# Patient Record
Sex: MALE | Race: WHITE | Employment: FULL TIME | URBAN - METROPOLITAN AREA
[De-identification: names, ages, dates, MRNs, and addresses within clinical notes are randomized per-mention and may not be internally consistent; named-entity substitution may affect disease eponyms.]

---

## 2018-06-19 ENCOUNTER — OFFICE VISIT (OUTPATIENT)
Dept: ENT CLINIC | Age: 63
End: 2018-06-19
Payer: COMMERCIAL

## 2018-06-19 VITALS
HEIGHT: 74 IN | BODY MASS INDEX: 30.51 KG/M2 | WEIGHT: 237.7 LBS | SYSTOLIC BLOOD PRESSURE: 139 MMHG | DIASTOLIC BLOOD PRESSURE: 92 MMHG | HEART RATE: 66 BPM

## 2018-06-19 DIAGNOSIS — B36.9 OTOMYCOSIS OF LEFT EAR: Primary | ICD-10-CM

## 2018-06-19 DIAGNOSIS — H61.23 BILATERAL IMPACTED CERUMEN: ICD-10-CM

## 2018-06-19 DIAGNOSIS — H62.42 OTOMYCOSIS OF LEFT EAR: Primary | ICD-10-CM

## 2018-06-19 PROCEDURE — 99214 OFFICE O/P EST MOD 30 MIN: CPT | Performed by: OTOLARYNGOLOGY

## 2018-06-19 ASSESSMENT — ENCOUNTER SYMPTOMS
GASTROINTESTINAL NEGATIVE: 1
EYES NEGATIVE: 1
RESPIRATORY NEGATIVE: 1
ALLERGIC/IMMUNOLOGIC NEGATIVE: 1

## 2018-12-18 ENCOUNTER — OFFICE VISIT (OUTPATIENT)
Dept: ENT CLINIC | Age: 63
End: 2018-12-18
Payer: COMMERCIAL

## 2018-12-18 VITALS
WEIGHT: 241 LBS | DIASTOLIC BLOOD PRESSURE: 82 MMHG | HEART RATE: 68 BPM | SYSTOLIC BLOOD PRESSURE: 150 MMHG | BODY MASS INDEX: 29.97 KG/M2 | HEIGHT: 75 IN

## 2018-12-18 DIAGNOSIS — H61.23 HEARING LOSS DUE TO CERUMEN IMPACTION, BILATERAL: Primary | ICD-10-CM

## 2018-12-18 PROCEDURE — 69210 REMOVE IMPACTED EAR WAX UNI: CPT | Performed by: OTOLARYNGOLOGY

## 2018-12-23 ASSESSMENT — ENCOUNTER SYMPTOMS
RHINORRHEA: 0
SINUS PRESSURE: 0
VOMITING: 0
TROUBLE SWALLOWING: 0
SINUS PAIN: 0
VOICE CHANGE: 0
SHORTNESS OF BREATH: 0
COUGH: 0

## 2018-12-23 NOTE — PROGRESS NOTES
[address] [facility] Otolaryngology  Dr. Matthieu Woo. Lacey Gonzales. Ms.Ed        Patient Name:  Rafat Serrano  :  1955     CHIEF C/O:    Chief Complaint   Patient presents with    Ear Problem     6 month f/u ear fullness   pt states that he is not having any problems at this time       HISTORY OBTAINED FROM:  patient    HISTORY OF PRESENT ILLNESS:       Lonnie Holley is a 61y.o. year old male, here today for follow up of Her loss. Patient states began approximately for 6 weeks ago as progressive nature. Patient has no history of hearing loss, no fullness, and ear pain. No current complaints of vertigo. No current complaints of ear drainage. Patient does not use Q-tips, no other drops this time. No prior history of headaches or vision changes. History reviewed. No pertinent past medical history. Past Surgical History:   Procedure Laterality Date    EYE SURGERY      lens implant rt eye     No current outpatient prescriptions on file. Patient has no known allergies. Social History   Substance Use Topics    Smoking status: Former Smoker     Packs/day: 1.00     Years: 20.00     Types: Cigarettes     Quit date: 1944    Smokeless tobacco: Never Used    Alcohol use No     History reviewed. No pertinent family history. Review of Systems   Constitutional: Negative for chills and fever. HENT: Positive for hearing loss. Negative for ear discharge, postnasal drip, rhinorrhea, sinus pain, sinus pressure, trouble swallowing and voice change. Respiratory: Negative for cough and shortness of breath. Cardiovascular: Negative for chest pain and palpitations. Gastrointestinal: Negative for vomiting. Skin: Negative for rash. Allergic/Immunologic: Negative for environmental allergies. Neurological: Negative for dizziness and headaches. Hematological: Does not bruise/bleed easily. All other systems reviewed and are negative.       BP (!) 150/82 (Site: Left Upper Arm, Position: Sitting, Cuff Size: Large Adult) Residency  Associate Clinical Professor:  Andrews Castillo, [facility]

## 2019-06-18 ENCOUNTER — OFFICE VISIT (OUTPATIENT)
Dept: ENT CLINIC | Age: 64
End: 2019-06-18
Payer: COMMERCIAL

## 2019-06-18 VITALS
DIASTOLIC BLOOD PRESSURE: 100 MMHG | HEART RATE: 99 BPM | BODY MASS INDEX: 31.44 KG/M2 | SYSTOLIC BLOOD PRESSURE: 139 MMHG | HEIGHT: 74 IN | WEIGHT: 245 LBS

## 2019-06-18 DIAGNOSIS — T16.1XXA ACUTE FOREIGN BODY OF RIGHT EAR CANAL, INITIAL ENCOUNTER: ICD-10-CM

## 2019-06-18 DIAGNOSIS — H61.23 BILATERAL IMPACTED CERUMEN: Primary | ICD-10-CM

## 2019-06-18 PROCEDURE — 69210 REMOVE IMPACTED EAR WAX UNI: CPT | Performed by: OTOLARYNGOLOGY

## 2019-06-18 PROCEDURE — 99213 OFFICE O/P EST LOW 20 MIN: CPT | Performed by: OTOLARYNGOLOGY

## 2019-06-18 ASSESSMENT — ENCOUNTER SYMPTOMS
VOMITING: 0
EYE DISCHARGE: 0
STRIDOR: 0
COUGH: 0
SHORTNESS OF BREATH: 0
NAUSEA: 0
COLOR CHANGE: 0
ALLERGIC/IMMUNOLOGIC NEGATIVE: 1
EYE REDNESS: 0

## 2019-06-18 NOTE — PROGRESS NOTES
Subjective:     Patient ID:  Matt Bowles is a 61 y.o. male. HPI:    Pt presents with a history of cerumen impactionremoval.   The patients ear was last cleaned 6 month(s)ago. The patient was using ear drops to loosen wax immediately priorto this visit. Hearing aids: no      Patient's medications, allergies, past medical,surgical, social and family histories were reviewed and updated as appropriate. Review of Systems   Constitutional: Negative for chills, fatigue and fever. HENT: Negative for ear discharge, ear pain and hearing loss. Eyes: Negative for discharge and redness. Respiratory: Negative for cough, shortness of breath and stridor. Gastrointestinal: Negative for nausea and vomiting. Endocrine: Negative. Genitourinary: Negative. Musculoskeletal: Negative. Skin: Negative for color change and rash. Allergic/Immunologic: Negative. Neurological: Negative for dizziness, speech difficulty and headaches. Hematological: Negative. Psychiatric/Behavioral: Negative for agitation and confusion. All other systems reviewed and are negative. Objective:   Physical Exam   Constitutional: He is oriented to person, place, and time. He appears well-developed and well-nourished. HENT:   Head: Normocephalic and atraumatic. Right Ear: Tympanic membrane, external ear and ear canal normal.   Left Ear: Tympanic membrane, external ear and ear canal normal.   Ears:    Nose: Nose normal.   Mouth/Throat: Oropharynx is clear and moist.   Eyes: Pupils are equal, round, and reactive to light. Neck: Normal range of motion. Cardiovascular: Normal rate. Pulmonary/Chest: Effort normal.   Musculoskeletal: Normal range of motion. Neurological: He is alert and oriented to person, place, and time. Skin: Skin is warm and dry. Cerumen removal     Auditory canal(s) both earscompletely obstructed with cerumen.   A microscope was used due to deep impaction of the

## 2019-12-17 ENCOUNTER — OFFICE VISIT (OUTPATIENT)
Dept: ENT CLINIC | Age: 64
End: 2019-12-17
Payer: COMMERCIAL

## 2019-12-17 VITALS
SYSTOLIC BLOOD PRESSURE: 124 MMHG | HEART RATE: 60 BPM | WEIGHT: 244 LBS | HEIGHT: 75 IN | DIASTOLIC BLOOD PRESSURE: 70 MMHG | RESPIRATION RATE: 20 BRPM | BODY MASS INDEX: 30.34 KG/M2

## 2019-12-17 DIAGNOSIS — H61.23 BILATERAL IMPACTED CERUMEN: Primary | ICD-10-CM

## 2019-12-17 PROCEDURE — 69210 REMOVE IMPACTED EAR WAX UNI: CPT | Performed by: OTOLARYNGOLOGY

## 2019-12-17 ASSESSMENT — ENCOUNTER SYMPTOMS
ALLERGIC/IMMUNOLOGIC NEGATIVE: 1
RESPIRATORY NEGATIVE: 1
EYES NEGATIVE: 1
TROUBLE SWALLOWING: 0
GASTROINTESTINAL NEGATIVE: 1
SORE THROAT: 0
VOICE CHANGE: 0

## 2020-06-16 ENCOUNTER — OFFICE VISIT (OUTPATIENT)
Dept: ENT CLINIC | Age: 65
End: 2020-06-16
Payer: COMMERCIAL

## 2020-06-16 VITALS
HEIGHT: 74 IN | WEIGHT: 237 LBS | HEART RATE: 68 BPM | DIASTOLIC BLOOD PRESSURE: 91 MMHG | SYSTOLIC BLOOD PRESSURE: 143 MMHG | BODY MASS INDEX: 30.42 KG/M2

## 2020-06-16 PROCEDURE — 69210 REMOVE IMPACTED EAR WAX UNI: CPT | Performed by: NURSE PRACTITIONER

## 2021-01-12 ENCOUNTER — OFFICE VISIT (OUTPATIENT)
Dept: ENT CLINIC | Age: 66
End: 2021-01-12
Payer: COMMERCIAL

## 2021-01-12 VITALS — BODY MASS INDEX: 29.84 KG/M2 | HEIGHT: 75 IN | WEIGHT: 240 LBS | TEMPERATURE: 97.3 F

## 2021-01-12 DIAGNOSIS — H61.23 BILATERAL IMPACTED CERUMEN: Primary | ICD-10-CM

## 2021-01-12 PROCEDURE — 69210 REMOVE IMPACTED EAR WAX UNI: CPT | Performed by: NURSE PRACTITIONER

## 2021-01-12 NOTE — PROGRESS NOTES
HENT: Negative for ear discharge, ear pain and hearing loss. Objective:     Vitals:    01/12/21 0857   Temp: 97.3 °F (36.3 °C)     Physical Exam  HENT:      Right Ear: Tympanic membrane, ear canal and external ear normal. There is impacted cerumen. Left Ear: Tympanic membrane, ear canal and external ear normal. There is impacted cerumen. Ears:      Comments: Bilateral TMs normal after cerumen removed            Cerumen removal     Auditory canal(s) both ears completely obstructed with cerumen. A microscope was used . Cerumen was gently removed using soft plastic curette, alligator forceps, suction. Tympanic membranes are intact following the procedure. Auditory canals appear normal.            Assessment:       Diagnosis Orders   1.  Bilateral impacted cerumen  CT REMOVAL IMPACTED CERUMEN INSTRUMENTATION UNILAT              Plan:      Call for new or worsening symptoms  Follow up in 6 month(s)    Lisa Duarte, LANA, FNP-C  [facility], Nose and Throat    The information contained in this note has been dictated using drug and medical speech recognition software and may contain errors

## 2024-01-01 ENCOUNTER — OUTSIDE SERVICES (OUTPATIENT)
Dept: INTERNAL MEDICINE CLINIC | Age: 69
End: 2024-01-01
Payer: MEDICARE

## 2024-01-01 ENCOUNTER — PREP FOR PROCEDURE (OUTPATIENT)
Dept: HEMATOLOGY | Age: 69
End: 2024-01-01

## 2024-01-01 DIAGNOSIS — M62.50 MUSCLE WASTING AND ATROPHY, NOT ELSEWHERE CLASSIFIED, UNSPECIFIED SITE: ICD-10-CM

## 2024-01-01 DIAGNOSIS — K21.9 GASTROESOPHAGEAL REFLUX DISEASE WITHOUT ESOPHAGITIS: ICD-10-CM

## 2024-01-01 DIAGNOSIS — C22.1 INTRAHEPATIC BILE DUCT CARCINOMA (HCC): ICD-10-CM

## 2024-01-01 DIAGNOSIS — R18.8 OTHER ASCITES: ICD-10-CM

## 2024-01-01 DIAGNOSIS — R13.11 DYSPHAGIA, ORAL PHASE: ICD-10-CM

## 2024-01-01 DIAGNOSIS — Z74.1 NEED FOR ASSISTANCE WITH PERSONAL CARE: ICD-10-CM

## 2024-01-01 DIAGNOSIS — D50.9 IRON DEFICIENCY ANEMIA, UNSPECIFIED IRON DEFICIENCY ANEMIA TYPE: ICD-10-CM

## 2024-01-01 DIAGNOSIS — C76.2 MALIGNANT NEOPLASM OF ABDOMEN (HCC): Primary | ICD-10-CM

## 2024-01-01 DIAGNOSIS — K74.69 OTHER CIRRHOSIS OF LIVER (HCC): ICD-10-CM

## 2024-01-01 DIAGNOSIS — M62.81 MUSCLE WEAKNESS (GENERALIZED): ICD-10-CM

## 2024-01-01 DIAGNOSIS — C78.7 SECONDARY MALIGNANT NEOPLASM OF LIVER AND INTRAHEPATIC BILE DUCT (HCC): ICD-10-CM

## 2024-01-01 DIAGNOSIS — J90 PLEURAL EFFUSION: ICD-10-CM

## 2024-01-01 DIAGNOSIS — E43 UNSPECIFIED SEVERE PROTEIN-CALORIE MALNUTRITION (HCC): ICD-10-CM

## 2024-01-01 PROCEDURE — 99305 1ST NF CARE MODERATE MDM 35: CPT | Performed by: INTERNAL MEDICINE

## 2024-01-01 RX ORDER — SODIUM CHLORIDE 0.9 % (FLUSH) 0.9 %
5-40 SYRINGE (ML) INJECTION EVERY 12 HOURS SCHEDULED
Status: CANCELLED | OUTPATIENT
Start: 2024-01-01

## 2024-01-01 RX ORDER — SODIUM CHLORIDE 9 MG/ML
INJECTION, SOLUTION INTRAVENOUS PRN
Status: CANCELLED | OUTPATIENT
Start: 2024-01-01

## 2024-01-01 RX ORDER — SODIUM CHLORIDE 0.9 % (FLUSH) 0.9 %
5-40 SYRINGE (ML) INJECTION PRN
Status: CANCELLED | OUTPATIENT
Start: 2024-01-01

## 2024-04-19 ENCOUNTER — APPOINTMENT (OUTPATIENT)
Dept: GENERAL RADIOLOGY | Age: 69
DRG: 420 | End: 2024-04-19
Payer: MEDICARE

## 2024-04-19 ENCOUNTER — APPOINTMENT (OUTPATIENT)
Dept: CT IMAGING | Age: 69
DRG: 420 | End: 2024-04-19
Payer: MEDICARE

## 2024-04-19 ENCOUNTER — HOSPITAL ENCOUNTER (INPATIENT)
Age: 69
LOS: 6 days | Discharge: HOME | DRG: 420 | End: 2024-04-26
Attending: EMERGENCY MEDICINE | Admitting: HOSPITALIST
Payer: MEDICARE

## 2024-04-19 DIAGNOSIS — J90 PLEURAL EFFUSION: ICD-10-CM

## 2024-04-19 DIAGNOSIS — D64.9 ANEMIA, UNSPECIFIED: ICD-10-CM

## 2024-04-19 DIAGNOSIS — J18.9 PNEUMONIA OF BOTH LOWER LOBES DUE TO INFECTIOUS ORGANISM: ICD-10-CM

## 2024-04-19 DIAGNOSIS — R18.8 OTHER ASCITES: ICD-10-CM

## 2024-04-19 DIAGNOSIS — I24.89 DEMAND ISCHEMIA (HCC): ICD-10-CM

## 2024-04-19 DIAGNOSIS — K74.69 OTHER CIRRHOSIS OF LIVER (HCC): ICD-10-CM

## 2024-04-19 DIAGNOSIS — K92.2 GASTROINTESTINAL HEMORRHAGE, UNSPECIFIED GASTROINTESTINAL HEMORRHAGE TYPE: Primary | ICD-10-CM

## 2024-04-19 DIAGNOSIS — K76.9 LIVER LESION: ICD-10-CM

## 2024-04-19 DIAGNOSIS — J96.01 ACUTE RESPIRATORY FAILURE WITH HYPOXIA (HCC): ICD-10-CM

## 2024-04-19 DIAGNOSIS — J18.9 PNEUMONIA DUE TO INFECTIOUS ORGANISM, UNSPECIFIED LATERALITY, UNSPECIFIED PART OF LUNG: ICD-10-CM

## 2024-04-19 DIAGNOSIS — E87.3 RESPIRATORY ALKALOSIS: ICD-10-CM

## 2024-04-19 DIAGNOSIS — N17.9 AKI (ACUTE KIDNEY INJURY) (HCC): ICD-10-CM

## 2024-04-19 LAB
ALBUMIN SERPL-MCNC: 1.1 G/DL (ref 3.5–5.2)
ALP SERPL-CCNC: 45 U/L (ref 40–129)
ALT SERPL-CCNC: <5 U/L (ref 0–40)
ANION GAP SERPL CALCULATED.3IONS-SCNC: 13 MMOL/L (ref 7–16)
AST SERPL-CCNC: 10 U/L (ref 0–39)
B.E.: -3.2 MMOL/L (ref -3–3)
BASOPHILS # BLD: 0 K/UL (ref 0–0.2)
BASOPHILS NFR BLD: 0 % (ref 0–2)
BILIRUB SERPL-MCNC: 0.7 MG/DL (ref 0–1.2)
BUN SERPL-MCNC: 16 MG/DL (ref 6–23)
CALCIUM SERPL-MCNC: 3.7 MG/DL (ref 8.6–10.2)
CHLORIDE SERPL-SCNC: 115 MMOL/L (ref 98–107)
CO2 SERPL-SCNC: 11 MMOL/L (ref 22–29)
COHB: 1 % (ref 0–1.5)
CREAT SERPL-MCNC: 0.7 MG/DL (ref 0.7–1.2)
CRITICAL: ABNORMAL
DATE ANALYZED: ABNORMAL
DATE OF COLLECTION: ABNORMAL
EOSINOPHIL # BLD: 0 K/UL (ref 0.05–0.5)
EOSINOPHILS RELATIVE PERCENT: 0 % (ref 0–6)
ERYTHROCYTE [DISTWIDTH] IN BLOOD BY AUTOMATED COUNT: 19.8 % (ref 11.5–15)
GFR SERPL CREATININE-BSD FRML MDRD: >90 ML/MIN/1.73M2
GLUCOSE SERPL-MCNC: 62 MG/DL (ref 74–99)
HCO3: 18.3 MMOL/L (ref 22–26)
HCT VFR BLD AUTO: 21.2 % (ref 37–54)
HGB BLD-MCNC: 6.1 G/DL (ref 12.5–16.5)
HHB: 1.8 % (ref 0–5)
LAB: ABNORMAL
LACTATE BLDV-SCNC: 4.3 MMOL/L (ref 0.5–2.2)
LYMPHOCYTES NFR BLD: 0 K/UL (ref 1.5–4)
LYMPHOCYTES RELATIVE PERCENT: 0 % (ref 20–42)
Lab: 2304
MAGNESIUM SERPL-MCNC: 1.9 MG/DL (ref 1.6–2.6)
MCH RBC QN AUTO: 21.6 PG (ref 26–35)
MCHC RBC AUTO-ENTMCNC: 28.8 G/DL (ref 32–34.5)
MCV RBC AUTO: 74.9 FL (ref 80–99.9)
METAMYELOCYTES ABSOLUTE COUNT: 0.1 K/UL (ref 0–0.12)
METAMYELOCYTES: 1 % (ref 0–1)
METHB: 0.2 % (ref 0–1.5)
MODE: ABNORMAL
MONOCYTES NFR BLD: 0.1 K/UL (ref 0.1–0.95)
MONOCYTES NFR BLD: 1 % (ref 2–12)
NEUTROPHILS NFR BLD: 98 % (ref 43–80)
NEUTS SEG NFR BLD: 11.1 K/UL (ref 1.8–7.3)
O2 SATURATION: 98.2 % (ref 92–98.5)
O2HB: 97 % (ref 94–97)
OPERATOR ID: ABNORMAL
PATIENT TEMP: 37 C
PCO2: 22.8 MMHG (ref 35–45)
PH BLOOD GAS: 7.52 (ref 7.35–7.45)
PLATELET # BLD AUTO: 136 K/UL (ref 130–450)
PMV BLD AUTO: 9.9 FL (ref 7–12)
PO2: 106.1 MMHG (ref 75–100)
POTASSIUM SERPL-SCNC: 2.3 MMOL/L (ref 3.5–5)
PROT SERPL-MCNC: 3 G/DL (ref 6.4–8.3)
RBC # BLD AUTO: 2.83 M/UL (ref 3.8–5.8)
RBC # BLD: ABNORMAL 10*6/UL
SODIUM SERPL-SCNC: 139 MMOL/L (ref 132–146)
SOURCE, BLOOD GAS: ABNORMAL
THB: 10.5 G/DL (ref 11.5–16.5)
TIME ANALYZED: 2308
TROPONIN I SERPL HS-MCNC: 21 NG/L (ref 0–11)
TROPONIN I SERPL HS-MCNC: 36 NG/L (ref 0–11)
WBC OTHER # BLD: 11.3 K/UL (ref 4.5–11.5)

## 2024-04-19 PROCEDURE — C9113 INJ PANTOPRAZOLE SODIUM, VIA: HCPCS

## 2024-04-19 PROCEDURE — 83605 ASSAY OF LACTIC ACID: CPT

## 2024-04-19 PROCEDURE — 2580000003 HC RX 258

## 2024-04-19 PROCEDURE — 84484 ASSAY OF TROPONIN QUANT: CPT

## 2024-04-19 PROCEDURE — 81001 URINALYSIS AUTO W/SCOPE: CPT

## 2024-04-19 PROCEDURE — 86850 RBC ANTIBODY SCREEN: CPT

## 2024-04-19 PROCEDURE — 87040 BLOOD CULTURE FOR BACTERIA: CPT

## 2024-04-19 PROCEDURE — 99285 EMERGENCY DEPT VISIT HI MDM: CPT

## 2024-04-19 PROCEDURE — 85025 COMPLETE CBC W/AUTO DIFF WBC: CPT

## 2024-04-19 PROCEDURE — 2580000003 HC RX 258: Performed by: EMERGENCY MEDICINE

## 2024-04-19 PROCEDURE — 80048 BASIC METABOLIC PNL TOTAL CA: CPT

## 2024-04-19 PROCEDURE — 72125 CT NECK SPINE W/O DYE: CPT

## 2024-04-19 PROCEDURE — 96365 THER/PROPH/DIAG IV INF INIT: CPT

## 2024-04-19 PROCEDURE — 71275 CT ANGIOGRAPHY CHEST: CPT

## 2024-04-19 PROCEDURE — 74177 CT ABD & PELVIS W/CONTRAST: CPT

## 2024-04-19 PROCEDURE — 6360000002 HC RX W HCPCS

## 2024-04-19 PROCEDURE — 86923 COMPATIBILITY TEST ELECTRIC: CPT

## 2024-04-19 PROCEDURE — 96375 TX/PRO/DX INJ NEW DRUG ADDON: CPT

## 2024-04-19 PROCEDURE — 71045 X-RAY EXAM CHEST 1 VIEW: CPT

## 2024-04-19 PROCEDURE — 70450 CT HEAD/BRAIN W/O DYE: CPT

## 2024-04-19 PROCEDURE — A4216 STERILE WATER/SALINE, 10 ML: HCPCS

## 2024-04-19 PROCEDURE — 82805 BLOOD GASES W/O2 SATURATION: CPT

## 2024-04-19 PROCEDURE — 80053 COMPREHEN METABOLIC PANEL: CPT

## 2024-04-19 PROCEDURE — 93005 ELECTROCARDIOGRAM TRACING: CPT | Performed by: EMERGENCY MEDICINE

## 2024-04-19 PROCEDURE — 83735 ASSAY OF MAGNESIUM: CPT

## 2024-04-19 PROCEDURE — 86901 BLOOD TYPING SEROLOGIC RH(D): CPT

## 2024-04-19 PROCEDURE — 6360000004 HC RX CONTRAST MEDICATION: Performed by: RADIOLOGY

## 2024-04-19 PROCEDURE — 2580000003 HC RX 258: Performed by: RADIOLOGY

## 2024-04-19 PROCEDURE — 86900 BLOOD TYPING SEROLOGIC ABO: CPT

## 2024-04-19 PROCEDURE — 96361 HYDRATE IV INFUSION ADD-ON: CPT

## 2024-04-19 PROCEDURE — 36556 INSERT NON-TUNNEL CV CATH: CPT

## 2024-04-19 RX ORDER — SODIUM CHLORIDE 0.9 % (FLUSH) 0.9 %
10 SYRINGE (ML) INJECTION PRN
Status: COMPLETED | OUTPATIENT
Start: 2024-04-19 | End: 2024-04-19

## 2024-04-19 RX ORDER — POTASSIUM CHLORIDE 7.45 MG/ML
10 INJECTION INTRAVENOUS
Status: DISCONTINUED | OUTPATIENT
Start: 2024-04-20 | End: 2024-04-20

## 2024-04-19 RX ORDER — PROCHLORPERAZINE EDISYLATE 5 MG/ML
10 INJECTION INTRAMUSCULAR; INTRAVENOUS ONCE
Status: COMPLETED | OUTPATIENT
Start: 2024-04-19 | End: 2024-04-19

## 2024-04-19 RX ORDER — 0.9 % SODIUM CHLORIDE 0.9 %
1000 INTRAVENOUS SOLUTION INTRAVENOUS ONCE
Status: COMPLETED | OUTPATIENT
Start: 2024-04-19 | End: 2024-04-19

## 2024-04-19 RX ORDER — SODIUM CHLORIDE 9 MG/ML
INJECTION, SOLUTION INTRAVENOUS CONTINUOUS
Status: DISCONTINUED | OUTPATIENT
Start: 2024-04-19 | End: 2024-04-20

## 2024-04-19 RX ORDER — SODIUM CHLORIDE 9 MG/ML
INJECTION, SOLUTION INTRAVENOUS PRN
Status: DISCONTINUED | OUTPATIENT
Start: 2024-04-19 | End: 2024-04-22

## 2024-04-19 RX ADMIN — PROCHLORPERAZINE EDISYLATE 10 MG: 5 INJECTION INTRAMUSCULAR; INTRAVENOUS at 20:51

## 2024-04-19 RX ADMIN — IOPAMIDOL 80 ML: 755 INJECTION, SOLUTION INTRAVENOUS at 21:23

## 2024-04-19 RX ADMIN — Medication 10 ML: at 21:24

## 2024-04-19 RX ADMIN — MEROPENEM 1000 MG: 1 INJECTION, POWDER, FOR SOLUTION INTRAVENOUS at 23:31

## 2024-04-19 RX ADMIN — SODIUM CHLORIDE 1000 ML: 9 INJECTION, SOLUTION INTRAVENOUS at 22:30

## 2024-04-19 RX ADMIN — PIPERACILLIN AND TAZOBACTAM 4500 MG: 4; .5 INJECTION, POWDER, FOR SOLUTION INTRAVENOUS at 23:18

## 2024-04-19 RX ADMIN — SODIUM CHLORIDE 80 MG: 9 INJECTION INTRAMUSCULAR; INTRAVENOUS; SUBCUTANEOUS at 21:21

## 2024-04-19 RX ADMIN — SODIUM CHLORIDE: 9 INJECTION, SOLUTION INTRAVENOUS at 20:43

## 2024-04-19 ASSESSMENT — LIFESTYLE VARIABLES
HOW OFTEN DO YOU HAVE A DRINK CONTAINING ALCOHOL: NEVER
HOW MANY STANDARD DRINKS CONTAINING ALCOHOL DO YOU HAVE ON A TYPICAL DAY: PATIENT DOES NOT DRINK

## 2024-04-19 ASSESSMENT — PAIN - FUNCTIONAL ASSESSMENT: PAIN_FUNCTIONAL_ASSESSMENT: NONE - DENIES PAIN

## 2024-04-20 ENCOUNTER — APPOINTMENT (OUTPATIENT)
Dept: GENERAL RADIOLOGY | Age: 69
DRG: 420 | End: 2024-04-20
Payer: MEDICARE

## 2024-04-20 ENCOUNTER — APPOINTMENT (OUTPATIENT)
Dept: CT IMAGING | Age: 69
DRG: 420 | End: 2024-04-20
Payer: MEDICARE

## 2024-04-20 PROBLEM — C76.2 ABDOMINAL CARCINOMATOSIS (HCC): Status: ACTIVE | Noted: 2024-04-20

## 2024-04-20 PROBLEM — C78.7 METASTASES TO THE LIVER (HCC): Status: ACTIVE | Noted: 2024-04-20

## 2024-04-20 PROBLEM — K92.2 GI BLEED: Status: ACTIVE | Noted: 2024-04-20

## 2024-04-20 LAB
ALBUMIN SERPL-MCNC: 2.1 G/DL (ref 3.5–5.2)
ALP SERPL-CCNC: 88 U/L (ref 40–129)
ALT SERPL-CCNC: 5 U/L (ref 0–40)
ANION GAP SERPL CALCULATED.3IONS-SCNC: 17 MMOL/L (ref 7–16)
ANION GAP SERPL CALCULATED.3IONS-SCNC: 19 MMOL/L (ref 7–16)
AST SERPL-CCNC: 20 U/L (ref 0–39)
BASOPHILS # BLD: 0.01 K/UL (ref 0–0.2)
BASOPHILS NFR BLD: 0 % (ref 0–2)
BILIRUB DIRECT SERPL-MCNC: 1 MG/DL (ref 0–0.3)
BILIRUB DIRECT SERPL-MCNC: 1.1 MG/DL (ref 0–0.3)
BILIRUB INDIRECT SERPL-MCNC: 0.6 MG/DL (ref 0–1)
BILIRUB INDIRECT SERPL-MCNC: 0.7 MG/DL (ref 0–1)
BILIRUB SERPL-MCNC: 1.6 MG/DL (ref 0–1.2)
BILIRUB SERPL-MCNC: 1.8 MG/DL (ref 0–1.2)
BILIRUB UR QL STRIP: ABNORMAL
BNP SERPL-MCNC: 1239 PG/ML (ref 0–125)
BUN SERPL-MCNC: 26 MG/DL (ref 6–23)
BUN SERPL-MCNC: 27 MG/DL (ref 6–23)
CALCIUM SERPL-MCNC: 7.2 MG/DL (ref 8.6–10.2)
CALCIUM SERPL-MCNC: 7.8 MG/DL (ref 8.6–10.2)
CHLORIDE SERPL-SCNC: 100 MMOL/L (ref 98–107)
CHLORIDE SERPL-SCNC: 93 MMOL/L (ref 98–107)
CLARITY UR: CLEAR
CO2 SERPL-SCNC: 18 MMOL/L (ref 22–29)
CO2 SERPL-SCNC: 20 MMOL/L (ref 22–29)
COLOR UR: YELLOW
CREAT SERPL-MCNC: 1.2 MG/DL (ref 0.7–1.2)
CREAT SERPL-MCNC: 1.3 MG/DL (ref 0.7–1.2)
EOSINOPHIL # BLD: 0 K/UL (ref 0.05–0.5)
EOSINOPHILS RELATIVE PERCENT: 0 % (ref 0–6)
ERYTHROCYTE [DISTWIDTH] IN BLOOD BY AUTOMATED COUNT: 22.2 % (ref 11.5–15)
ERYTHROCYTE [SEDIMENTATION RATE] IN BLOOD BY WESTERGREN METHOD: 30 MM/HR (ref 0–15)
FERRITIN SERPL-MCNC: 1785 NG/ML
FOLATE SERPL-MCNC: 3.7 NG/ML (ref 4.8–24.2)
GFR SERPL CREATININE-BSD FRML MDRD: 60 ML/MIN/1.73M2
GFR SERPL CREATININE-BSD FRML MDRD: 65 ML/MIN/1.73M2
GLUCOSE BLD-MCNC: 161 MG/DL (ref 74–99)
GLUCOSE SERPL-MCNC: 114 MG/DL (ref 74–99)
GLUCOSE SERPL-MCNC: 152 MG/DL (ref 74–99)
GLUCOSE UR STRIP-MCNC: NEGATIVE MG/DL
HAPTOGLOB SERPL-MCNC: 178 MG/DL (ref 30–200)
HCT VFR BLD AUTO: 35.9 % (ref 37–54)
HGB BLD-MCNC: 11.1 G/DL (ref 12.5–16.5)
HGB UR QL STRIP.AUTO: NEGATIVE
IMM GRANULOCYTES # BLD AUTO: 0.08 K/UL (ref 0–0.58)
IMM GRANULOCYTES NFR BLD: 1 % (ref 0–5)
IMM RETICS NFR: 28.3 % (ref 2.3–13.4)
INR PPP: 2.4
IRON SATN MFR SERPL: 24 % (ref 20–55)
IRON SERPL-MCNC: 26 UG/DL (ref 59–158)
KETONES UR STRIP-MCNC: ABNORMAL MG/DL
L PNEUMO1 AG UR QL IA.RAPID: NEGATIVE
LACTATE BLDV-SCNC: 1.9 MMOL/L (ref 0.5–2.2)
LACTATE BLDV-SCNC: 3.5 MMOL/L (ref 0.5–2.2)
LDH SERPL-CCNC: 174 U/L (ref 135–225)
LEUKOCYTE ESTERASE UR QL STRIP: ABNORMAL
LYMPHOCYTES NFR BLD: 1.86 K/UL (ref 1.5–4)
LYMPHOCYTES RELATIVE PERCENT: 13 % (ref 20–42)
MAGNESIUM SERPL-MCNC: 2 MG/DL (ref 1.6–2.6)
MCH RBC QN AUTO: 23.6 PG (ref 26–35)
MCHC RBC AUTO-ENTMCNC: 30.9 G/DL (ref 32–34.5)
MCV RBC AUTO: 76.4 FL (ref 80–99.9)
MONOCYTES NFR BLD: 0.83 K/UL (ref 0.1–0.95)
MONOCYTES NFR BLD: 6 % (ref 2–12)
NEUTROPHILS NFR BLD: 81 % (ref 43–80)
NEUTS SEG NFR BLD: 11.97 K/UL (ref 1.8–7.3)
NITRITE UR QL STRIP: NEGATIVE
PARTIAL THROMBOPLASTIN TIME: 36.2 SEC (ref 24.5–35.1)
PH UR STRIP: 6 [PH] (ref 5–9)
PHOSPHATE SERPL-MCNC: 5.3 MG/DL (ref 2.5–4.5)
PLATELET # BLD AUTO: 169 K/UL (ref 130–450)
PMV BLD AUTO: 10.2 FL (ref 7–12)
POTASSIUM SERPL-SCNC: 4 MMOL/L (ref 3.5–5)
POTASSIUM SERPL-SCNC: 4.1 MMOL/L (ref 3.5–5)
PROCALCITONIN SERPL-MCNC: 8.17 NG/ML (ref 0–0.08)
PROT SERPL-MCNC: 6 G/DL (ref 6.4–8.3)
PROT UR STRIP-MCNC: ABNORMAL MG/DL
PROTHROMBIN TIME: 26.1 SEC (ref 9.3–12.4)
RBC # BLD AUTO: 4.7 M/UL (ref 3.8–5.8)
RBC #/AREA URNS HPF: NORMAL /HPF
RETIC HEMOGLOBIN: 23.7 PG (ref 28.2–36.6)
RETICS # AUTO: 0.19 M/UL
RETICS/RBC NFR AUTO: 4 % (ref 0.4–1.9)
S PNEUM AG SPEC QL: NEGATIVE
SODIUM SERPL-SCNC: 132 MMOL/L (ref 132–146)
SODIUM SERPL-SCNC: 135 MMOL/L (ref 132–146)
SP GR UR STRIP: 1.01 (ref 1–1.03)
SPECIMEN SOURCE: NORMAL
TIBC SERPL-MCNC: 109 UG/DL (ref 250–450)
TROPONIN I SERPL HS-MCNC: 26 NG/L (ref 0–11)
UROBILINOGEN UR STRIP-ACNC: 4 EU/DL (ref 0–1)
VIT B12 SERPL-MCNC: 1374 PG/ML (ref 211–946)
WBC #/AREA URNS HPF: NORMAL /HPF
WBC OTHER # BLD: 14.8 K/UL (ref 4.5–11.5)

## 2024-04-20 PROCEDURE — 99222 1ST HOSP IP/OBS MODERATE 55: CPT | Performed by: SURGERY

## 2024-04-20 PROCEDURE — 99222 1ST HOSP IP/OBS MODERATE 55: CPT

## 2024-04-20 PROCEDURE — 80074 ACUTE HEPATITIS PANEL: CPT

## 2024-04-20 PROCEDURE — 02HV33Z INSERTION OF INFUSION DEVICE INTO SUPERIOR VENA CAVA, PERCUTANEOUS APPROACH: ICD-10-PCS | Performed by: STUDENT IN AN ORGANIZED HEALTH CARE EDUCATION/TRAINING PROGRAM

## 2024-04-20 PROCEDURE — 6360000004 HC RX CONTRAST MEDICATION: Performed by: RADIOLOGY

## 2024-04-20 PROCEDURE — A4216 STERILE WATER/SALINE, 10 ML: HCPCS

## 2024-04-20 PROCEDURE — 74160 CT ABDOMEN W/CONTRAST: CPT

## 2024-04-20 PROCEDURE — 6360000002 HC RX W HCPCS

## 2024-04-20 PROCEDURE — 87081 CULTURE SCREEN ONLY: CPT

## 2024-04-20 PROCEDURE — 87086 URINE CULTURE/COLONY COUNT: CPT

## 2024-04-20 PROCEDURE — 87449 NOS EACH ORGANISM AG IA: CPT

## 2024-04-20 PROCEDURE — 87899 AGENT NOS ASSAY W/OPTIC: CPT

## 2024-04-20 PROCEDURE — 83735 ASSAY OF MAGNESIUM: CPT

## 2024-04-20 PROCEDURE — 2580000003 HC RX 258: Performed by: STUDENT IN AN ORGANIZED HEALTH CARE EDUCATION/TRAINING PROGRAM

## 2024-04-20 PROCEDURE — 82728 ASSAY OF FERRITIN: CPT

## 2024-04-20 PROCEDURE — 80053 COMPREHEN METABOLIC PANEL: CPT

## 2024-04-20 PROCEDURE — 2000000000 HC ICU R&B

## 2024-04-20 PROCEDURE — 82746 ASSAY OF FOLIC ACID SERUM: CPT

## 2024-04-20 PROCEDURE — 82962 GLUCOSE BLOOD TEST: CPT

## 2024-04-20 PROCEDURE — 74018 RADEX ABDOMEN 1 VIEW: CPT

## 2024-04-20 PROCEDURE — 83605 ASSAY OF LACTIC ACID: CPT

## 2024-04-20 PROCEDURE — 83550 IRON BINDING TEST: CPT

## 2024-04-20 PROCEDURE — 84145 PROCALCITONIN (PCT): CPT

## 2024-04-20 PROCEDURE — 6360000002 HC RX W HCPCS: Performed by: STUDENT IN AN ORGANIZED HEALTH CARE EDUCATION/TRAINING PROGRAM

## 2024-04-20 PROCEDURE — 99291 CRITICAL CARE FIRST HOUR: CPT | Performed by: INTERNAL MEDICINE

## 2024-04-20 PROCEDURE — 30233N1 TRANSFUSION OF NONAUTOLOGOUS RED BLOOD CELLS INTO PERIPHERAL VEIN, PERCUTANEOUS APPROACH: ICD-10-PCS | Performed by: STUDENT IN AN ORGANIZED HEALTH CARE EDUCATION/TRAINING PROGRAM

## 2024-04-20 PROCEDURE — 2580000003 HC RX 258

## 2024-04-20 PROCEDURE — 85652 RBC SED RATE AUTOMATED: CPT

## 2024-04-20 PROCEDURE — 83880 ASSAY OF NATRIURETIC PEPTIDE: CPT

## 2024-04-20 PROCEDURE — 82247 BILIRUBIN TOTAL: CPT

## 2024-04-20 PROCEDURE — 85610 PROTHROMBIN TIME: CPT

## 2024-04-20 PROCEDURE — P9016 RBC LEUKOCYTES REDUCED: HCPCS

## 2024-04-20 PROCEDURE — C9113 INJ PANTOPRAZOLE SODIUM, VIA: HCPCS

## 2024-04-20 PROCEDURE — 83010 ASSAY OF HAPTOGLOBIN QUANT: CPT

## 2024-04-20 PROCEDURE — 85730 THROMBOPLASTIN TIME PARTIAL: CPT

## 2024-04-20 PROCEDURE — 82607 VITAMIN B-12: CPT

## 2024-04-20 PROCEDURE — 84100 ASSAY OF PHOSPHORUS: CPT

## 2024-04-20 PROCEDURE — 85045 AUTOMATED RETICULOCYTE COUNT: CPT

## 2024-04-20 PROCEDURE — 85025 COMPLETE CBC W/AUTO DIFF WBC: CPT

## 2024-04-20 PROCEDURE — 83540 ASSAY OF IRON: CPT

## 2024-04-20 PROCEDURE — 83615 LACTATE (LD) (LDH) ENZYME: CPT

## 2024-04-20 PROCEDURE — 36430 TRANSFUSION BLD/BLD COMPNT: CPT

## 2024-04-20 PROCEDURE — 84484 ASSAY OF TROPONIN QUANT: CPT

## 2024-04-20 PROCEDURE — 82248 BILIRUBIN DIRECT: CPT

## 2024-04-20 RX ORDER — OCTREOTIDE ACETATE 50 UG/ML
50 INJECTION, SOLUTION INTRAVENOUS; SUBCUTANEOUS ONCE
Status: COMPLETED | OUTPATIENT
Start: 2024-04-20 | End: 2024-04-20

## 2024-04-20 RX ORDER — ACETAMINOPHEN 650 MG/1
650 SUPPOSITORY RECTAL EVERY 6 HOURS PRN
Status: DISCONTINUED | OUTPATIENT
Start: 2024-04-20 | End: 2024-04-26 | Stop reason: HOSPADM

## 2024-04-20 RX ORDER — ONDANSETRON 4 MG/1
4 TABLET, ORALLY DISINTEGRATING ORAL EVERY 8 HOURS PRN
Status: DISCONTINUED | OUTPATIENT
Start: 2024-04-20 | End: 2024-04-26 | Stop reason: HOSPADM

## 2024-04-20 RX ORDER — SODIUM CHLORIDE 9 MG/ML
INJECTION, SOLUTION INTRAVENOUS PRN
Status: DISCONTINUED | OUTPATIENT
Start: 2024-04-20 | End: 2024-04-26 | Stop reason: HOSPADM

## 2024-04-20 RX ORDER — SODIUM CHLORIDE 0.9 % (FLUSH) 0.9 %
5-40 SYRINGE (ML) INJECTION PRN
Status: DISCONTINUED | OUTPATIENT
Start: 2024-04-20 | End: 2024-04-26 | Stop reason: HOSPADM

## 2024-04-20 RX ORDER — POLYETHYLENE GLYCOL 3350 17 G/17G
17 POWDER, FOR SOLUTION ORAL DAILY PRN
Status: DISCONTINUED | OUTPATIENT
Start: 2024-04-20 | End: 2024-04-21

## 2024-04-20 RX ORDER — SODIUM CHLORIDE 0.9 % (FLUSH) 0.9 %
5-40 SYRINGE (ML) INJECTION EVERY 12 HOURS SCHEDULED
Status: DISCONTINUED | OUTPATIENT
Start: 2024-04-20 | End: 2024-04-26 | Stop reason: HOSPADM

## 2024-04-20 RX ORDER — ACETAMINOPHEN 325 MG/1
650 TABLET ORAL EVERY 6 HOURS PRN
Status: DISCONTINUED | OUTPATIENT
Start: 2024-04-20 | End: 2024-04-26 | Stop reason: HOSPADM

## 2024-04-20 RX ORDER — ONDANSETRON 2 MG/ML
4 INJECTION INTRAMUSCULAR; INTRAVENOUS EVERY 6 HOURS PRN
Status: DISCONTINUED | OUTPATIENT
Start: 2024-04-20 | End: 2024-04-26 | Stop reason: HOSPADM

## 2024-04-20 RX ORDER — DEXTROSE, SODIUM CHLORIDE, SODIUM LACTATE, POTASSIUM CHLORIDE, AND CALCIUM CHLORIDE 5; .6; .31; .03; .02 G/100ML; G/100ML; G/100ML; G/100ML; G/100ML
INJECTION, SOLUTION INTRAVENOUS CONTINUOUS
Status: ACTIVE | OUTPATIENT
Start: 2024-04-20 | End: 2024-04-21

## 2024-04-20 RX ORDER — DEXTROSE, SODIUM CHLORIDE, SODIUM LACTATE, POTASSIUM CHLORIDE, AND CALCIUM CHLORIDE 5; .6; .31; .03; .02 G/100ML; G/100ML; G/100ML; G/100ML; G/100ML
INJECTION, SOLUTION INTRAVENOUS CONTINUOUS
Status: DISCONTINUED | OUTPATIENT
Start: 2024-04-20 | End: 2024-04-20

## 2024-04-20 RX ADMIN — PANTOPRAZOLE SODIUM 40 MG: 40 INJECTION, POWDER, FOR SOLUTION INTRAVENOUS at 06:12

## 2024-04-20 RX ADMIN — MEROPENEM 1000 MG: 1 INJECTION, POWDER, FOR SOLUTION INTRAVENOUS at 06:34

## 2024-04-20 RX ADMIN — PANTOPRAZOLE SODIUM 40 MG: 40 INJECTION, POWDER, FOR SOLUTION INTRAVENOUS at 17:46

## 2024-04-20 RX ADMIN — IOPAMIDOL 75 ML: 755 INJECTION, SOLUTION INTRAVENOUS at 15:46

## 2024-04-20 RX ADMIN — SODIUM CHLORIDE, SODIUM LACTATE, POTASSIUM CHLORIDE, CALCIUM CHLORIDE AND DEXTROSE MONOHYDRATE: 5; 600; 310; 30; 20 INJECTION, SOLUTION INTRAVENOUS at 21:05

## 2024-04-20 RX ADMIN — PIPERACILLIN AND TAZOBACTAM 4500 MG: 4; .5 INJECTION, POWDER, FOR SOLUTION INTRAVENOUS at 17:47

## 2024-04-20 RX ADMIN — OCTREOTIDE ACETATE 50 MCG: 50 INJECTION, SOLUTION INTRAVENOUS; SUBCUTANEOUS at 02:32

## 2024-04-20 RX ADMIN — SODIUM CHLORIDE, PRESERVATIVE FREE 5 ML: 5 INJECTION INTRAVENOUS at 20:46

## 2024-04-20 RX ADMIN — OCTREOTIDE ACETATE 25 MCG/HR: 500 INJECTION, SOLUTION INTRAVENOUS; SUBCUTANEOUS at 03:25

## 2024-04-20 RX ADMIN — SODIUM CHLORIDE, SODIUM LACTATE, POTASSIUM CHLORIDE, CALCIUM CHLORIDE AND DEXTROSE MONOHYDRATE: 5; 600; 310; 30; 20 INJECTION, SOLUTION INTRAVENOUS at 09:54

## 2024-04-20 RX ADMIN — PHYTONADIONE 10 MG: 10 INJECTION, EMULSION INTRAMUSCULAR; INTRAVENOUS; SUBCUTANEOUS at 08:02

## 2024-04-20 RX ADMIN — PIPERACILLIN AND TAZOBACTAM 4500 MG: 4; .5 INJECTION, POWDER, FOR SOLUTION INTRAVENOUS at 10:04

## 2024-04-20 RX ADMIN — VANCOMYCIN HYDROCHLORIDE 2000 MG: 10 INJECTION, POWDER, LYOPHILIZED, FOR SOLUTION INTRAVENOUS at 00:51

## 2024-04-20 RX ADMIN — VANCOMYCIN HYDROCHLORIDE 1250 MG: 10 INJECTION, POWDER, LYOPHILIZED, FOR SOLUTION INTRAVENOUS at 20:45

## 2024-04-20 NOTE — PLAN OF CARE
Problem: Discharge Planning  Goal: Discharge to home or other facility with appropriate resources  4/20/2024 1819 by Woodrow Vital RN  Outcome: Progressing     Problem: Safety - Adult  Goal: Free from fall injury  4/20/2024 1819 by Woodrow Vital RN  Outcome: Progressing     Problem: Pain  Goal: Verbalizes/displays adequate comfort level or baseline comfort level  4/20/2024 1819 by Woodrow Vital RN  Outcome: Progressing  Flowsheets (Taken 4/20/2024 1600)  Verbalizes/displays adequate comfort level or baseline comfort level:   Encourage patient to monitor pain and request assistance   Assess pain using appropriate pain scale   Administer analgesics based on type and severity of pain and evaluate response   Implement non-pharmacological measures as appropriate and evaluate response   Consider cultural and social influences on pain and pain management   Notify Licensed Independent Practitioner if interventions unsuccessful or patient reports new pain     Problem: Nutrition Deficit:  Goal: Optimize nutritional status  4/20/2024 1819 by Woodrow Vital RN  Outcome: Progressing

## 2024-04-20 NOTE — CONSULTS
[facility]   Attending Physician Statement:    I personally saw, examined and provided care for the patient. Radiographs, labs and medication list were reviewed by me independently.  The case was discussed in detail and plans for care were established. Review of Residents documentation was conducted and revisions were made as appropriate. I agree with the above documented exam, problem list and plan of care.    CC: Air within the gallbladder    HPI: 68 y.o. male who presents to the ED for weakness fatigue and vomiting of bright red blood.  Patient has been unwell for the past month.  He has decreased oral intake secondary nausea.  He notes weight loss.  He has not seen a doctor.  No prior surgical history never had a EGD or colonoscopy states that family history of colon and lung cancer in his dad.  Currently denies any abdominal pain.      I have reviewed the patient's past medical, surgical, social history and I agree with resident's documentation.    Review of systems-pertinent positives noted in HPI, otherwise negative    Exam:  Awake alert x3  Appears unwell  Heart regular rate rhythm  Lungs are clear bilaterally  Abdomen soft nontender distended      Assessment plan:  Principal Problem:    GI bleed  Active Problems:    Abdominal carcinomatosis (HCC)    Metastases to the liver (HCC)  Resolved Problems:    * No resolved hospital problems. *      - I have reviewed the  progress note from the  ED provider visit on 4/19  .  -I have reviewed the CCM  progress note  on 4/20/2024  .    -I have reviewed the following  labs:  CBC:   Lab Results   Component Value Date/Time    WBC 14.8 04/20/2024 05:38 AM    RBC 4.70 04/20/2024 05:38 AM    HGB 11.1 04/20/2024 05:38 AM    HCT 35.9 04/20/2024 05:38 AM    MCV 76.4 04/20/2024 05:38 AM    MCH 23.6 04/20/2024 05:38 AM    MCHC 30.9 04/20/2024 05:38 AM    RDW 22.2 04/20/2024 05:38 AM     04/20/2024 05:38 AM    MPV 10.2 04/20/2024 05:38 AM     CMP:    Lab

## 2024-04-20 NOTE — FLOWSHEET NOTE
4 Eyes Skin Assessment     NAME:  Marcello Escudero  YOB: 1955  MEDICAL RECORD NUMBER:  [id number]    The patient is being assessed for  Admission    I agree that at least one RN has performed a thorough Head to Toe Skin Assessment on the patient. ALL assessment sites listed below have been assessed.      Areas assessed by both nurses:    Head, Face, Ears, Shoulders, Back, Chest, Arms, Elbows, Hands, Sacrum. Buttock, Coccyx, Ischium, Legs. Feet and Heels, and Under Medical Devices         Does the Patient have a Wound? No noted wound(s)       Merrill Prevention initiated by RN: Yes  Wound Care Orders initiated by RN: No    Pressure Injury (Stage 3,4, Unstageable, DTI, NWPT, and Complex wounds) if present, place Wound referral order by RN under : No    New Ostomies, if present place, Ostomy referral order under : No     Nurse 1 eSignature: Electronically signed by Gwendolyn Redd RN on 4/20/24 at 4:20 AM EDT    **SHARE this note so that the co-signing nurse can place an eSignature**    Nurse 2 eSignature: Electronically signed by Ben Comer RN on 4/20/24 at 4:50 AM EDT

## 2024-04-20 NOTE — PROGRESS NOTES
Antibiotic Extended Infusion Policy     This patient is on medication that requires renal, weight, and/or indication dose adjustment.      Date Body Weight IBW  Adjusted BW SCr  CrCl Dialysis status BMI   4/20/2024 88.6 kg (195 lb 6.4 oz) Ideal body weight: 82.2 kg (181 lb 3.5 oz)  Adjusted ideal body weight: 84.8 kg (186 lb 14.3 oz) Serum creatinine: 1.2 mg/dL 04/20/24 0729  Estimated creatinine clearance: 69 mL/min N/a Body mass index is 25.09 kg/m².       Pharmacy has dose-adjusted the following medication(s):    Ordered Medication: Zosyn 3375mg q8H     Order Changed/converted to: Zosyn 4500mg q8h    These changes were made per protocol according to the [facility]   Automatic Extended Infusion Dose Adjustment Policy.     *Please note this dose may need readjusted if patient's condition changes.    Please contact pharmacy with any questions regarding these changes.    Ketan Cedeño RPH  4/20/2024  9:23 AM

## 2024-04-20 NOTE — CONSULTS
[facility]  Internal Medicine Residency Program  CONSULT NOTE  MICU    Patient:  Marcello Escudero 68 y.o. male MRN: [id number]     Date of Service: 4/20/2024    Hospital Day: 2      Chief complaint: emesis and fatighe  History of Present Illness   The patient is a 68 y.o. male who presented to the ED with chief complaint of generalized fatigue and emesis.  Patient said that he had been vomiting and having diarrhea intermittently for the past month.  Patient also said that he had decreased appetite and increasing nausea.  He said that he tripped and fell on the day of presentation to the ED but did not hit his head or had LOC. Patient mentioned at the ED that he had 1 episode of emesis with dark gastric content.  He also reported a 10 pound weight loss over the last month.  Patient says that he has not seen a doctor in years and did not take any medication.  He reported not previous surgical history.  Patient said that his father was diagnosed with colon and lung cancer at age 70.  Patient denies melena, hematochezia, chest pain, abdominal pain, fever or chills. Pt said he used to drink alcohol but quit 12 years ago.     At the ED CT abdomen pelvis was significant for ascites and concern for carcinomatosis, multiple hepatic lesions with concern for metastatic disease.  There was also air noted within the gallbladder which could possibly be secondary to a fistula without signs of obstruction or infection.  Also gallbladder thickening likely secondary to new onset of cystitis.  Bilateral pleural effusions. CTA chest showed findings suggestive of atypical pulmonary infiltrates as described and   atelectasis, with pulmonary edema not excluded. FOBT at the ED was negative     At the ED patient was given meropenem, pip-tazo, and vancomycin.      General surgery was consulted which recommended a consult to GI for possible variceal bleeding and possible new onset cirrhosis, as well as IR for paracentesis      Lines     site day    Art line   None    TLC R Fem    PICC None    Hemoaccess None      Oxygen:    nasal canula at 2L/min(FiO2:95%)         Recent Labs     244   PH 7.522*   PCO2 22.8*   PO2 106.1*   HCO3 18.3*   BE -3.2*   O2SAT 98.2   THB 10.5*      Respiration range   : Resp  Av.8  Min: 15  Max: 33  Pulse Ox range : SpO2  Av %  Min: 89 %  Max: 98 %    Labs and Imaging Studies   Labs  Recent Labs     24   WBC 11.3   RBC 2.83*   HGB 6.1*   HCT 21.2*   MCV 74.9*   MCH 21.6*   MCHC 28.8*   RDW 19.8*      MPV 9.9     Recent Labs     24    132   K 2.3* 4.0   * 93*   MG  --  1.9   CO2 11* 20*   BUN 16 27*   CREATININE 0.7 1.3*   ANIONGAP 13 19*   GLUCOSE 62* 114*   CALCIUM 3.7* 7.8*   PROT 3.0*  --    BILITOT 0.7  --    ALKPHOS 45  --    AST 10  --    ALT <5  --      Recent Labs     24  0012   LACTA 4.3* 3.5*     Recent Labs     244   TROPHS 21* 36*     Recent Labs     24  0012   PROTIME 26.1*   INR 2.4     No results for input(s): \"CKTOTAL\", \"CKMB\", \"CKMBINDEX\", \"TROPONINI\" in the last 72 hours.  No results for input(s): \"BNP\" in the last 72 hours.  No results for input(s): \"CHOL\", \"HDL\", \"TRIG\" in the last 72 hours.    Invalid input(s): \"CHOLHDLR\", \"LDLCALCU\"  No results found for: \"LABA1C\", \"PGN1EJOG\"   No results found for: \"TSH\", \"TSHREFLEX\", \"TSHFT4\", \"TSHELE\", \"YNU0GIU\", \"TSHHS\"           Infectious   Temperature range: Temp  Av.9 °F (36.6 °C)  Min: 97.5 °F (36.4 °C)  Max: 98.3 °F (36.8 °C)  Recent Labs     24  0012   WBC 11.3  --    LACTA 4.3* 3.5*       Results       Procedure Component Value Units Date/Time    Culture, Blood 1 [[id number]] Collected: 24    Order Status: Completed Specimen: Blood Updated: 24     Specimen Description .BLOOD     Special Requests          Culture NO GROWTH <24 HRS    Culture, Blood 2 [[id number]]

## 2024-04-20 NOTE — H&P
Hospitalist History & Physical      PCP: No primary care provider on file.    Date of Service: Pt seen/examined on 4/20/2024     admitted to Inpatient with expected LOS greater than two midnights due to medical therapy.      Chief Complaint:  had concerns including Fatigue, Emesis, and Seizures (Weakness for sometime, vomiting bright red blood, seizure per EMS lasting about 5-7 minutes. Now A&O x4).    History of Present Illness:    Mr. Marcello Escudero, a 68 y.o. year old male  who  has no past medical history on file.  Who presented to ED over complaints of generalized fatigue and emesis.  Patient states the complaint has been ongoing for the last month and worsened over the last few days and had dark coffee-ground emesis earlier yesterday evening.  Per chart review, there was also concern for of patient having seizure-like activity per EMS.  Hospital course thus far remarkable for patient with hypokalemia, lactic acidosis lactic acid 4.3, hemoglobin 6.1.  CT of abdomen with significant ascites and concern for carcinomatosis, air in gallbladder, bilateral pleural effusions.  General surgery was consulted who evaluated patient and recommended GI consult for possible varices seal bleeding and new onset cirrhosis.   Patient did receive packed red blood cells, Protonix, vancomycin and Zosyn due to possible pneumonia as well.  Patient was admitted to medicine with consults placed for critical care and GI.    Past Medical History:    No past medical history on file.    Past Surgical History:        Procedure Laterality Date    EYE SURGERY      lens implant rt eye       Medications Prior to Admission:      Prior to Admission medications    Not on File       Allergies:  Patient has no known allergies.    Social History:    RESIDENCE: home  TOBACCO:   reports that he has quit smoking. His smoking use included cigarettes. He has a 20.0 pack-year smoking history. He has never used smokeless tobacco.  ETOH:   reports no

## 2024-04-20 NOTE — PROGRESS NOTES
Pharmacy Consultation Note  (Antibiotic Dosing and Monitoring)    Initial consult date: 4/20/24  Consulting physician/provider: Dr. Noble  Drug: Vancomycin  Indication: CAP/ Intra- abdominal    Age/  Gender Height Weight IBW  Allergy Information   68 y.o./male 188 cm (6' 2\") 104.3 kg (230 lb)     Ideal body weight: 82.2 kg (181 lb 3.5 oz)  Adjusted ideal body weight: 84.8 kg (186 lb 14.3 oz)   Patient has no known allergies.      Renal Function:  Recent Labs     04/19/24  2153 04/19/24  2304 04/20/24  0729   BUN 16 27* 26*   CREATININE 0.7 1.3* 1.2         Intake/Output Summary (Last 24 hours) at 4/20/2024 1942  Last data filed at 4/20/2024 1600  Gross per 24 hour   Intake 3453.71 ml   Output 850 ml   Net 2603.71 ml         Vancomycin Monitoring:  Trough:  No results for input(s): \"VANCOTROUGH\" in the last 72 hours.  Random:  No results for input(s): \"VANCORANDOM\" in the last 72 hours.    Vancomycin Administration Times:  Recent vancomycin administrations                     vancomycin (VANCOCIN) 2,000 mg in sodium chloride 0.9 % 500 mL IVPB (mg) 2,000 mg New Bag 04/20/24 0051                    Assessment:  Patient is a 68 y.o. male who has been initiated on vancomycin  Estimated Creatinine Clearance: 69 mL/min (based on SCr of 1.2 mg/dL).  To dose vancomycin, pharmacy will be utilizing  trough based dosing    Plan:  Received 2000 mg load.  Will adjust to  vancomycin 1250 mg IV every 12 hours  Will check vancomycin levels when appropriate  Will continue to monitor renal function   Pharmacy to follow    Thank you for your consult    Raina MetzD BCPS 4/20/2024 7:42 PM

## 2024-04-20 NOTE — CONSENT
Informed Consent for Blood Component Transfusion Note    I have discussed with the patient the rationale for blood component transfusion; its benefits in treating or preventing fatigue, organ damage, or death; and its risk which includes mild transfusion reactions, rare risk of blood borne infection, or more serious but rare reactions. I have discussed the alternatives to transfusion, including the risk and consequences of not receiving transfusion. The patient had an opportunity to ask questions and had agreed to proceed with transfusion of blood components.    Electronically signed by Siri Mckinnon DO on 4/19/24 at 10:28 PM EDT

## 2024-04-20 NOTE — ED NOTES
First unit of blood completed. Second unit started at this time. Blood has reached vein. Patient will be monitored for 15 minutes.

## 2024-04-20 NOTE — ED PROVIDER NOTES
97.0 %    COHb 1.0 0.0 - 1.5 %    MetHb 0.2 0.0 - 1.5 %    HHb 1.8 0.0 - 5.0 %    tHb (est) 10.5 (L) 11.5 - 16.5 g/dL    Mode NC-2  L     Date Of Collection 20240419     Time Collected 2304     Pt Temp 37.0 C     ID [id number]  02       Lab [id number]     Critical(s) Notified . No Critical Values    TYPE AND SCREEN   Result Value Ref Range    Blood Bank Sample Expiration 04/22/2024,2359     Arm Band Number [id number]     ABO/Rh O POSITIVE     Antibody Screen NEGATIVE     Unit Number [id number]     Component Leukocyte Reduced Red Cell     Unit Divison 00     Dispense Status Blood Bank ALLOCATED     Transfusion Status OK TO TRANSFUSE     Crossmatch Result COMPATIBLE        RADIOLOGY:  CT HEAD WO CONTRAST   Final Result   1. No acute intracranial abnormality.   2. Moderate cerebral atrophy with periventricular white matter changes.   3. Nonspecific white matter changes, likely related to chronic microvascular   ischemic disease.         CT ABDOMEN PELVIS W IV CONTRAST Additional Contrast? None   Preliminary Result   1. Ascites and suspicious for peritoneal nodularity concerning for   carcinomatosis.  Differential consideration of infection such as bacterial   peritonitis also with a suggestion of hepatic cirrhosis present.   2. Multiple indistinct hepatic lesions concerning for metastases.   3. Dilated small bowel loops with wall thickening mostly seen amidst the   ascites raising the possibility of reactive ileus versus obstruction.   4. Gallbladder air, that may include wall and extrinsic involvement in the   mesentery such that cholecystitis even with perforation or fistula to the   right colon are considerations.   Results were discussed with the ordering physician 4/19/2024 at 22:37.         CT CERVICAL SPINE WO CONTRAST   Final Result   1. No acute fracture or traumatic malalignment of the cervical spine.   2. Multilevel discogenic changes throughout the cervical spine most   pronounced at the C5-6 and C6-7 levels.  Interpretation: improved with 2 L NC         Counseling:  I have spoken with the patient and discussed today’s results, in addition to providing specific details for the plan of care and counseling regarding the diagnosis and prognosis.  Their questions are answered at this time and they are agreeable with the plan of admission.      This patient has remained hemodynamically stable during their ED course.    Diagnosis:  1. Gastrointestinal hemorrhage, unspecified gastrointestinal hemorrhage type    2. Pneumonia of both lower lobes due to infectious organism    3. Other ascites    4. Liver lesion    5. Other cirrhosis of liver (HCC)    6. Demand ischemia (HCC)    7. DONY (acute kidney injury) (HCC)    8. Respiratory alkalosis        Disposition:  Patient's disposition: Admit to CCU/ICU  Patient's condition is stable.      Attending was present and available throughout encounter including all critical portions; See Attending Note/Attestation for Final Plan      Please refer to my separate ED provider note with my attestation and my separate documentation of the encounter.            Rowdy Mclean MD  04/20/24 0411

## 2024-04-20 NOTE — PLAN OF CARE
Problem: Discharge Planning  Goal: Discharge to home or other facility with appropriate resources  Outcome: Progressing     Problem: Safety - Adult  Goal: Free from fall injury  Outcome: Progressing     Problem: Pain  Goal: Verbalizes/displays adequate comfort level or baseline comfort level  Outcome: Progressing     Problem: Nutrition Deficit:  Goal: Optimize nutritional status  Outcome: Not Progressing

## 2024-04-20 NOTE — ED NOTES
ATTENDING PROVIDER ATTESTATION:     I have personally performed and/or participated in the history, exam, medical decision making, and procedures and agree with all pertinent clinical information.      I have also reviewed and agree with the past medical, family and social history unless otherwise noted.    I have discussed this patient in detail with the resident, and provided the instruction and education regarding weakness.    My findings/Plan: I was the primary provider for patient.  Patient with no seen past medical history except for sensorineural hearing loss as well as cerumen impaction presenting here because of weakness.  Patient reportedly vomited blood at home.  Patient was weak he did not fall per report from EMS he may have had a seizure but also may have passed out.  Patient reporting no chest pain no abdominal pain he reports no black or tarry stools he reports no headache.    Patient is awake alert oriented x 3 heart exam tachycardic lungs are clear abdomen mildly distended but nontender.  Patient able to move all extremities.  Patient is pale appearing patient does not smoke.  Patient differential clues GI bleed as well as electrolyte disturbance as well as seizure   Also considered arrhythmia.  Patient was last seen by otolaryngology for bilateral cerumen impaction on 1/12/2021     EKG:  This EKG is signed and interpreted by me.    Rate: 130  Rhythm: Sinus tachycardia  Interpretation: no acute changes  Comparison:none  Patient was ordered IV patient was given normal saline fluid bolus.  Patient lab work noted patient's sodium was 139 potassium was 2.3 patient's BUN 16 creatinine 0.7 alk phos is 45 patient's white count was 11.3 hemoglobin 6.1 Hemoccult was negative.  Patient ABG pH was 7.5 pCO2 was 22 pO2 was 106 patient's O2 sat was 98.  Patient's chest x-ray showed bilateral infiltrates..  Patient CT head showed no intracranial findings.  Patient CT cervical spine showed no fracture or  injury) ([facility])    8. Respiratory alkalosis    9. Acute respiratory failure with hypoxia (HCC)    10. Pleural effusion    11. Pneumonia due to infectious organism, unspecified laterality, unspecified part of lung        DISPOSITION  Disposition: Admit to CCU/ICU  Patient condition is fair      Rowdy Mclean MD  04/19/24 2033       Rowdy Mclean MD  04/20/24 0017       Rowdy Mclean MD  04/20/24 0410

## 2024-04-20 NOTE — PROGRESS NOTES
Pharmacy Consultation Note  (Antibiotic Dosing and Monitoring)    Initial consult date: 4/20/24  Consulting physician/provider: Dr. Noble  Drug: Vancomycin  Indication: CAP/ Intra- abdominal    Age/  Gender Height Weight IBW  Allergy Information   68 y.o./male 188 cm (6' 2\") 104.3 kg (230 lb)     Ideal body weight: 82.2 kg (181 lb 3.5 oz)  Adjusted ideal body weight: 84.8 kg (186 lb 14.3 oz)   Patient has no known allergies.      Renal Function:  Recent Labs     04/19/24 2153 04/19/24  2304   BUN 16 27*   CREATININE 0.7 1.3*       Intake/Output Summary (Last 24 hours) at 4/20/2024 0540  Last data filed at 4/20/2024 0529  Gross per 24 hour   Intake 3453.71 ml   Output --   Net 3453.71 ml       Vancomycin Monitoring:  Trough:  No results for input(s): \"VANCOTROUGH\" in the last 72 hours.  Random:  No results for input(s): \"VANCORANDOM\" in the last 72 hours.    Vancomycin Administration Times:  Recent vancomycin administrations                     vancomycin (VANCOCIN) 2,000 mg in sodium chloride 0.9 % 500 mL IVPB (mg) 2,000 mg New Bag 04/20/24 0051                    Assessment:  Patient is a 68 y.o. male who has been initiated on vancomycin  Estimated Creatinine Clearance: 63 mL/min (A) (based on SCr of 1.3 mg/dL (H)).  To dose vancomycin, pharmacy will be utilizing  trough based dosing    Plan:  Received 2000 mg load.  Will continue vancomycin 1250 mg IV every 24 hours  Will check vancomycin levels when appropriate  Will continue to monitor renal function   Pharmacy to follow    Thank you for your consult    Raina MetzD BCPS 4/20/2024 5:40 AM

## 2024-04-20 NOTE — PROGRESS NOTES
Radiology Procedure Waiver   Name: Marcello Escudero  : 1955  MRN: [id number]    Date:  24    Time: 8:47 PM EDT    Benefits of immediately proceeding with radiology exam(s) without pre-testing outweigh the risks or are not indicated as specified below and therefore the following is/are being waived:    [x] Benefits of immediate radiology exam(s) outweigh any risk.                                               OR    Pre-exam testing is not indicated for the following reason(s):  [] Pregnancy test   [] Patients LMP on-time and regular.   [] Patient had Tubal Ligation or has other Contraception Device.   [] Patient  is Menopausal or Premenarcheal.    [] Patient had Full or Partial Hysterectomy.    [] Protocol for CT contrast allegry   [] Patient has tolerated well previously   [] Patient does not have a true allergy    [] MRI Questionnaire     [] BUN/Creatinine   [] Patient age w/no hx of renal dysfunction.   [] Patient on Dialysis.   [] Recent Normal Labs.  Electronically signed by Siri Mckinnon DO on 24 at 8:47 PM EDT

## 2024-04-20 NOTE — CONSULTS
Laterality Date    EYE SURGERY      lens implant rt eye       Medications Prior to Admission:    Prior to Admission medications    Not on File       No Known Allergies    Family History   Problem Relation Age of Onset    Diabetes Father     ; No family history of problems with anesthesia     Social History     Tobacco Use    Smoking status: Former     Current packs/day: 1.00     Average packs/day: 1 pack/day for 20.0 years (20.0 ttl pk-yrs)     Types: Cigarettes    Smokeless tobacco: Never   Vaping Use    Vaping Use: Never used   Substance Use Topics    Alcohol use: No     Alcohol/week: 0.0 standard drinks of alcohol    Drug use: No         Review of Systems   Review of Systems   Constitutional:  Positive for appetite change, fatigue and fever.   Gastrointestinal:  Positive for diarrhea, nausea and vomiting.   All other systems reviewed and are negative.        PHYSICAL EXAM:    Vitals:    04/19/24 2205   BP: 95/66   Pulse: (!) 123   Resp: 25   Temp:    SpO2: 94%       General appearance: Ill-appearing  Head: NCAT, EOMI, red conjunctiva  Neck: supple, no masses, trachea midline  Lungs: symmetric chest rise, no respiratory distress  Heart: normal rate per peripheral pulse  Abdomen: Soft, nontender, distended, no guarding or rebound tenderness noted      LABS:  CBC  Recent Labs     04/19/24  2153   WBC 11.3   HGB 6.1*   HCT 21.2*        BMP  Recent Labs     04/19/24  2153      K 2.3*   *   CO2 11*   BUN 16   CREATININE 0.7   CALCIUM 3.7*     Liver Function  Recent Labs     04/19/24  2153   BILITOT 0.7   AST 10   ALT <5   ALKPHOS 45   PROT 3.0*     No results for input(s): \"LACTATE\" in the last 72 hours.  No results for input(s): \"INR\", \"PTT\" in the last 72 hours.    Invalid input(s): \"PT\"    RADIOLOGY  I have personally reviewed all relevant imaging        ASSESSMENT:      There is no problem list on file for this patient.      68 y.o. male with new onset ascites, bilateral pleural effusions,  concern for carcinomatosis and multiple hepatic lesions with concern for metastatic disease    PLAN:    -Recommend a consult to GI for possible variceal bleeding and possible new onset cirrhosis, patient has no prior history of EGD or colonoscopy  - PT/INR for MELD calculation  -Air is noted in the gallbladder, possibility of fistula to the colon, no obstructive symptoms noted, or sign of infection, gallbladder likely thickened secondary to large volume ascites would recommend IR for paracentesis and possible liver biopsy    Plan will be discussed with Attending      Kwaku Estrada DO  General Surgery Resident, PGY-1    Electronically signed by Kwaku Estrada DO on 4/19/24 at 11:54 PM EDT

## 2024-04-21 ENCOUNTER — APPOINTMENT (OUTPATIENT)
Dept: GENERAL RADIOLOGY | Age: 69
DRG: 420 | End: 2024-04-21
Payer: MEDICARE

## 2024-04-21 LAB
ABO/RH: NORMAL
ALBUMIN SERPL-MCNC: 2.2 G/DL (ref 3.5–5.2)
ALP SERPL-CCNC: 83 U/L (ref 40–129)
ALT SERPL-CCNC: 6 U/L (ref 0–40)
ANION GAP SERPL CALCULATED.3IONS-SCNC: 11 MMOL/L (ref 7–16)
ANION GAP SERPL CALCULATED.3IONS-SCNC: 12 MMOL/L (ref 7–16)
ANTIBODY SCREEN: NEGATIVE
ARM BAND NUMBER: NORMAL
AST SERPL-CCNC: 20 U/L (ref 0–39)
BASOPHILS # BLD: 0 K/UL (ref 0–0.2)
BASOPHILS NFR BLD: 0 % (ref 0–2)
BILIRUB SERPL-MCNC: 1.5 MG/DL (ref 0–1.2)
BLOOD BANK BLOOD PRODUCT EXPIRATION DATE: NORMAL
BLOOD BANK BLOOD PRODUCT EXPIRATION DATE: NORMAL
BLOOD BANK DISPENSE STATUS: NORMAL
BLOOD BANK DISPENSE STATUS: NORMAL
BLOOD BANK ISBT PRODUCT BLOOD TYPE: 5100
BLOOD BANK ISBT PRODUCT BLOOD TYPE: 5100
BLOOD BANK PRODUCT CODE: NORMAL
BLOOD BANK PRODUCT CODE: NORMAL
BLOOD BANK SAMPLE EXPIRATION: NORMAL
BLOOD BANK UNIT TYPE AND RH: NORMAL
BLOOD BANK UNIT TYPE AND RH: NORMAL
BPU ID: NORMAL
BPU ID: NORMAL
BUN SERPL-MCNC: 17 MG/DL (ref 6–23)
BUN SERPL-MCNC: 18 MG/DL (ref 6–23)
CALCIUM SERPL-MCNC: 7.3 MG/DL (ref 8.6–10.2)
CALCIUM SERPL-MCNC: 7.6 MG/DL (ref 8.6–10.2)
CEA SERPL-MCNC: 2.5 NG/ML (ref 0–5.2)
CHLORIDE SERPL-SCNC: 101 MMOL/L (ref 98–107)
CHLORIDE SERPL-SCNC: 103 MMOL/L (ref 98–107)
CO2 SERPL-SCNC: 21 MMOL/L (ref 22–29)
CO2 SERPL-SCNC: 21 MMOL/L (ref 22–29)
COMPONENT: NORMAL
COMPONENT: NORMAL
CREAT SERPL-MCNC: 0.9 MG/DL (ref 0.7–1.2)
CREAT SERPL-MCNC: 0.9 MG/DL (ref 0.7–1.2)
CROSSMATCH RESULT: NORMAL
CROSSMATCH RESULT: NORMAL
DATE LAST DOSE: ABNORMAL
EKG ATRIAL RATE: 130 BPM
EKG P AXIS: 49 DEGREES
EKG P-R INTERVAL: 138 MS
EKG Q-T INTERVAL: 324 MS
EKG QRS DURATION: 72 MS
EKG QTC CALCULATION (BAZETT): 476 MS
EKG R AXIS: 42 DEGREES
EKG T AXIS: 83 DEGREES
EKG VENTRICULAR RATE: 130 BPM
EOSINOPHIL # BLD: 0.12 K/UL (ref 0.05–0.5)
EOSINOPHILS RELATIVE PERCENT: 2 % (ref 0–6)
ERYTHROCYTE [DISTWIDTH] IN BLOOD BY AUTOMATED COUNT: 21.6 % (ref 11.5–15)
GFR SERPL CREATININE-BSD FRML MDRD: >90 ML/MIN/1.73M2
GFR SERPL CREATININE-BSD FRML MDRD: >90 ML/MIN/1.73M2
GLUCOSE BLD-MCNC: 176 MG/DL (ref 74–99)
GLUCOSE SERPL-MCNC: 160 MG/DL (ref 74–99)
GLUCOSE SERPL-MCNC: 182 MG/DL (ref 74–99)
HCT VFR BLD AUTO: 34.5 % (ref 37–54)
HGB BLD-MCNC: 10.4 G/DL (ref 12.5–16.5)
INR PPP: 1.4
LYMPHOCYTES NFR BLD: 0.55 K/UL (ref 1.5–4)
LYMPHOCYTES RELATIVE PERCENT: 8 % (ref 20–42)
MAGNESIUM SERPL-MCNC: 2 MG/DL (ref 1.6–2.6)
MCH RBC QN AUTO: 23.2 PG (ref 26–35)
MCHC RBC AUTO-ENTMCNC: 30.1 G/DL (ref 32–34.5)
MCV RBC AUTO: 76.8 FL (ref 80–99.9)
MICROORGANISM SPEC CULT: NO GROWTH
MICROORGANISM SPEC CULT: NORMAL
MONOCYTES NFR BLD: 0.43 K/UL (ref 0.1–0.95)
MONOCYTES NFR BLD: 6 % (ref 2–12)
NEUTROPHILS NFR BLD: 84 % (ref 43–80)
NEUTS SEG NFR BLD: 5.9 K/UL (ref 1.8–7.3)
PATH REV BLD -IMP: NORMAL
PHOSPHATE SERPL-MCNC: 2.3 MG/DL (ref 2.5–4.5)
PLATELET # BLD AUTO: 166 K/UL (ref 130–450)
PMV BLD AUTO: 9.8 FL (ref 7–12)
POTASSIUM SERPL-SCNC: 3.2 MMOL/L (ref 3.5–5)
POTASSIUM SERPL-SCNC: 4.1 MMOL/L (ref 3.5–5)
PROT SERPL-MCNC: 5.9 G/DL (ref 6.4–8.3)
PROTHROMBIN TIME: 15.3 SEC (ref 9.3–12.4)
RBC # BLD AUTO: 4.49 M/UL (ref 3.8–5.8)
RBC # BLD: ABNORMAL 10*6/UL
SODIUM SERPL-SCNC: 134 MMOL/L (ref 132–146)
SODIUM SERPL-SCNC: 135 MMOL/L (ref 132–146)
SPECIMEN DESCRIPTION: NORMAL
SPECIMEN DESCRIPTION: NORMAL
TME LAST DOSE: ABNORMAL H
TRANSFUSION STATUS: NORMAL
TRANSFUSION STATUS: NORMAL
UNIT DIVISION: 0
UNIT DIVISION: 0
UNIT ISSUE DATE/TIME: NORMAL
UNIT ISSUE DATE/TIME: NORMAL
VANCOMYCIN DOSE: ABNORMAL MG
VANCOMYCIN TROUGH SERPL-MCNC: 16.1 UG/ML (ref 5–16)
WBC OTHER # BLD: 7 K/UL (ref 4.5–11.5)

## 2024-04-21 PROCEDURE — 2000000000 HC ICU R&B

## 2024-04-21 PROCEDURE — 6360000002 HC RX W HCPCS

## 2024-04-21 PROCEDURE — 2580000003 HC RX 258

## 2024-04-21 PROCEDURE — 80202 ASSAY OF VANCOMYCIN: CPT

## 2024-04-21 PROCEDURE — A4216 STERILE WATER/SALINE, 10 ML: HCPCS

## 2024-04-21 PROCEDURE — 86301 IMMUNOASSAY TUMOR CA 19-9: CPT

## 2024-04-21 PROCEDURE — 6360000002 HC RX W HCPCS: Performed by: STUDENT IN AN ORGANIZED HEALTH CARE EDUCATION/TRAINING PROGRAM

## 2024-04-21 PROCEDURE — 2580000003 HC RX 258: Performed by: STUDENT IN AN ORGANIZED HEALTH CARE EDUCATION/TRAINING PROGRAM

## 2024-04-21 PROCEDURE — 6370000000 HC RX 637 (ALT 250 FOR IP): Performed by: INTERNAL MEDICINE

## 2024-04-21 PROCEDURE — 83735 ASSAY OF MAGNESIUM: CPT

## 2024-04-21 PROCEDURE — 2500000003 HC RX 250 WO HCPCS: Performed by: STUDENT IN AN ORGANIZED HEALTH CARE EDUCATION/TRAINING PROGRAM

## 2024-04-21 PROCEDURE — 84100 ASSAY OF PHOSPHORUS: CPT

## 2024-04-21 PROCEDURE — 82378 CARCINOEMBRYONIC ANTIGEN: CPT

## 2024-04-21 PROCEDURE — 82105 ALPHA-FETOPROTEIN SERUM: CPT

## 2024-04-21 PROCEDURE — 82962 GLUCOSE BLOOD TEST: CPT

## 2024-04-21 PROCEDURE — 74018 RADEX ABDOMEN 1 VIEW: CPT

## 2024-04-21 PROCEDURE — 85610 PROTHROMBIN TIME: CPT

## 2024-04-21 PROCEDURE — 86316 IMMUNOASSAY TUMOR OTHER: CPT

## 2024-04-21 PROCEDURE — 99233 SBSQ HOSP IP/OBS HIGH 50: CPT | Performed by: INTERNAL MEDICINE

## 2024-04-21 PROCEDURE — 80048 BASIC METABOLIC PNL TOTAL CA: CPT

## 2024-04-21 PROCEDURE — 93010 ELECTROCARDIOGRAM REPORT: CPT | Performed by: INTERNAL MEDICINE

## 2024-04-21 PROCEDURE — C9113 INJ PANTOPRAZOLE SODIUM, VIA: HCPCS

## 2024-04-21 PROCEDURE — 85025 COMPLETE CBC W/AUTO DIFF WBC: CPT

## 2024-04-21 PROCEDURE — 99232 SBSQ HOSP IP/OBS MODERATE 35: CPT | Performed by: INTERNAL MEDICINE

## 2024-04-21 PROCEDURE — 80053 COMPREHEN METABOLIC PANEL: CPT

## 2024-04-21 RX ORDER — POTASSIUM CHLORIDE 29.8 MG/ML
40 INJECTION INTRAVENOUS ONCE
Status: COMPLETED | OUTPATIENT
Start: 2024-04-21 | End: 2024-04-21

## 2024-04-21 RX ORDER — POTASSIUM CHLORIDE 7.45 MG/ML
10 INJECTION INTRAVENOUS
Status: DISCONTINUED | OUTPATIENT
Start: 2024-04-21 | End: 2024-04-21

## 2024-04-21 RX ORDER — POLYETHYLENE GLYCOL 3350 17 G/17G
17 POWDER, FOR SOLUTION ORAL 2 TIMES DAILY
Status: DISCONTINUED | OUTPATIENT
Start: 2024-04-21 | End: 2024-04-26 | Stop reason: HOSPADM

## 2024-04-21 RX ORDER — FOLIC ACID 1 MG/1
1 TABLET ORAL DAILY
Status: DISCONTINUED | OUTPATIENT
Start: 2024-04-21 | End: 2024-04-26 | Stop reason: HOSPADM

## 2024-04-21 RX ORDER — POTASSIUM CHLORIDE 29.8 MG/ML
40 INJECTION INTRAVENOUS ONCE
Status: DISCONTINUED | OUTPATIENT
Start: 2024-04-21 | End: 2024-04-21 | Stop reason: ALTCHOICE

## 2024-04-21 RX ADMIN — VANCOMYCIN HYDROCHLORIDE 1250 MG: 10 INJECTION, POWDER, LYOPHILIZED, FOR SOLUTION INTRAVENOUS at 21:41

## 2024-04-21 RX ADMIN — SODIUM CHLORIDE, PRESERVATIVE FREE 10 ML: 5 INJECTION INTRAVENOUS at 08:48

## 2024-04-21 RX ADMIN — SODIUM CHLORIDE, PRESERVATIVE FREE 10 ML: 5 INJECTION INTRAVENOUS at 20:40

## 2024-04-21 RX ADMIN — POTASSIUM CHLORIDE 40 MEQ: 29.8 INJECTION, SOLUTION INTRAVENOUS at 12:58

## 2024-04-21 RX ADMIN — PIPERACILLIN AND TAZOBACTAM 4500 MG: 4; .5 INJECTION, POWDER, FOR SOLUTION INTRAVENOUS at 17:53

## 2024-04-21 RX ADMIN — POTASSIUM PHOSPHATE, MONOBASIC AND POTASSIUM PHOSPHATE, DIBASIC 10 MMOL: 224; 236 INJECTION, SOLUTION, CONCENTRATE INTRAVENOUS at 08:47

## 2024-04-21 RX ADMIN — ONDANSETRON 4 MG: 2 INJECTION INTRAMUSCULAR; INTRAVENOUS at 14:21

## 2024-04-21 RX ADMIN — PANTOPRAZOLE SODIUM 40 MG: 40 INJECTION, POWDER, FOR SOLUTION INTRAVENOUS at 06:32

## 2024-04-21 RX ADMIN — POTASSIUM CHLORIDE 40 MEQ: 29.8 INJECTION, SOLUTION INTRAVENOUS at 09:02

## 2024-04-21 RX ADMIN — PIPERACILLIN AND TAZOBACTAM 4500 MG: 4; .5 INJECTION, POWDER, FOR SOLUTION INTRAVENOUS at 03:16

## 2024-04-21 RX ADMIN — PIPERACILLIN AND TAZOBACTAM 4500 MG: 4; .5 INJECTION, POWDER, FOR SOLUTION INTRAVENOUS at 09:54

## 2024-04-21 RX ADMIN — POLYETHYLENE GLYCOL 3350 17 G: 17 POWDER, FOR SOLUTION ORAL at 11:34

## 2024-04-21 RX ADMIN — PHYTONADIONE 10 MG: 10 INJECTION, EMULSION INTRAMUSCULAR; INTRAVENOUS; SUBCUTANEOUS at 08:48

## 2024-04-21 RX ADMIN — VANCOMYCIN HYDROCHLORIDE 1250 MG: 10 INJECTION, POWDER, LYOPHILIZED, FOR SOLUTION INTRAVENOUS at 08:58

## 2024-04-21 RX ADMIN — PANTOPRAZOLE SODIUM 40 MG: 40 INJECTION, POWDER, FOR SOLUTION INTRAVENOUS at 17:53

## 2024-04-21 RX ADMIN — FOLIC ACID 1 MG: 1 TABLET ORAL at 11:34

## 2024-04-21 ASSESSMENT — PAIN SCALES - GENERAL: PAINLEVEL_OUTOF10: 0

## 2024-04-21 NOTE — PROGRESS NOTES
[facility] Hospitalist Progress Note    SYNOPSIS: Patient admitted on 2024 for GI bleed    Mr. Marcello Escudero, a 68 y.o. year old male  who  has no past medical history on file.  Who presented to ED over complaints of generalized fatigue and emesis.  Patient states the complaint has been ongoing for the last month and worsened over the last few days and had dark coffee-ground emesis earlier yesterday evening.  Per chart review, there was also concern for of patient having seizure-like activity per EMS.  Hospital course thus far remarkable for patient with hypokalemia, lactic acidosis lactic acid 4.3, hemoglobin 6.1.  CT of abdomen with significant ascites and concern for carcinomatosis, air in gallbladder, bilateral pleural effusions.  General surgery was consulted who evaluated patient and recommended GI consult for possible variceal bleeding and new onset cirrhosis. Patient did receive packed red blood cells, Protonix, vancomycin and Zosyn due to possible pneumonia as well.  Patient was admitted to medicine with consults placed for critical care and GI. Oncology also consulted. There is high concern for neoplastic process. IR consulted for liver biopsy and paracentesis.     SUBJECTIVE:  Stable overnight. No other overnight issues reported.   Records reviewed.       Temp (24hrs), Av.7 °F (36.5 °C), Min:97.5 °F (36.4 °C), Max:98 °F (36.7 °C)    DIET: ADULT DIET; Full Liquid  Diet NPO  CODE: Full Code    Intake/Output Summary (Last 24 hours) at 2024 1149  Last data filed at 2024 0712  Gross per 24 hour   Intake 3039.88 ml   Output 1350 ml   Net 1689.88 ml       Review of Systems  All bolded are positive; please see HPI  General:  Fever, chills, diaphoresis, fatigue, malaise, night sweats, weight loss  Psychological:  Anxiety, disorientation, hallucinations.  ENT:  Epistaxis, headaches, vertigo, visual changes.  Cardiovascular:  Chest pain, irregular heartbeats, palpitations, paroxysmal  DAILY    +++++++++++++++++++++++++++++++++++++++++++++++++  Nicole Traylor MD  [facility]  [facility], [place]  +++++++++++++++++++++++++++++++++++++++++++++++++  NOTE: This report was transcribed using voice recognition software. Every effort was made to ensure accuracy; however, inadvertent computerized transcription errors may be present.

## 2024-04-21 NOTE — CONSULTS
[facility]  Hematology/Oncology  Consult  Dr. Matt Bustamante      Patient Name: Marcello Escudero  YOB: 1955  PCP: No primary care provider on file.   Referring Provider:      Reason for Consultation:   Chief Complaint   Patient presents with    Fatigue    Emesis    Seizures     Weakness for sometime, vomiting bright red blood, seizure per EMS lasting about 5-7 minutes. Now A&O x4        History of Present Illness:  This pt is a 69 yo male who initially presented to the ER with weakness, fatigue and bright red emesis. He endorses weight loss and a ~1 month feeling of malaise. CBC with normal WBC and platelets, however Hgb was 6.1, MCV 74. Iron profile with ferritin 1785, low serum iron and low TIBC. B12 normal, folate low. Retic % was 4.0. CMP with mild DONY, now improved. Lactic acid 4.3, down trended to 1.9. LFTs with T bili down to 1.5 (elevation was from direct bilirubin). CTA with moderate R and moderate to large L pleural effusion, no PE, patchy infiltrates. CT A/P with ascites and suspicion for peritoneal nodularity concerning for carcinomatosis, multiple indistinct hepatic lesions, soft tissue mass abutting duodenum and cecum. Repeat CT A/P yesterday showing multiple dilated SB up to 4.5 cm, increased compared to prior. CBC today with normal WBC and platelets, Hgb improved to 10.4, s/p 2 units pRBCs, MCV 76. Oncology consulted for further evaluation     Diagnostic Data:     No past medical history on file.    Patient Active Problem List    Diagnosis Date Noted    GI bleed 04/20/2024    Abdominal carcinomatosis (HCC) 04/20/2024    Metastases to the liver (HCC) 04/20/2024        Past Surgical History:   Procedure Laterality Date    EYE SURGERY      lens implant rt eye       Family History  Family History   Problem Relation Age of Onset    Diabetes Father        Social History    TOBACCO:   reports that he has quit smoking. His smoking use included cigarettes. He has a 20.0 pack-year  ABDOMEN W CONTRAST Additional Contrast? Oral   Final Result   1. Multiple dilated loops of small bowel measuring up to 4.5 cm increased   when compared to prior imaging and may represent obstruction versus ileus.   Oral contrast has not passed to the colon however the small bowel is only   partially visualized secondary to field of view.   2. Slightly increased bilateral pleural effusions and adjacent consolidations   likely atelectasis.   3. Essentially unchanged ascites and peritoneal nodularity suggesting   carcinomatosis.   4. Unchanged appearance of the liver.   5. Persistent air in the gallbladder may represent fistula.         XR ABDOMEN FOR NG/OG/NE TUBE PLACEMENT   Final Result   1. Enteric tube tip and side hole superimposing the stomach.   2. Mildly distended gas-filled small bowel in the central abdomen.   3. Left greater than right pleural and parenchymal opacities in the lung   bases.         CT HEAD WO CONTRAST   Final Result   1. No acute intracranial abnormality.   2. Moderate cerebral atrophy with periventricular white matter changes.   3. Nonspecific white matter changes, likely related to chronic microvascular   ischemic disease.         CT ABDOMEN PELVIS W IV CONTRAST Additional Contrast? None   Final Result   1. Ascites and suspicious for peritoneal nodularity concerning for   carcinomatosis.  Differential consideration of infection such as bacterial   peritonitis also with a suggestion of hepatic cirrhosis present.   2. Multiple indistinct hepatic lesions concerning for metastases.   3. Dilated small bowel loops with wall thickening mostly seen amidst the   ascites suspicious for obstruction versus reactive ileus.   4. Indistinct soft tissue mass area in the right abdomen abutting the   duodenum and cecum suspicious for malignancy and may be contributing to   obstruction; high attenuation sigmoid colon foci consistent with blood in   conjunction with the history.  Consider enteric contrasted

## 2024-04-21 NOTE — PROGRESS NOTES
[facility]   Department of Internal Medicine   Internal Medicine Residency  MICU Progress Note    Patient:  Marcello Escudero 68 y.o. male   MRN: [id number]       Date of Service: 2024    Allergy: Patient has no known allergies.    Subjective     Patient was seen and examined this morning at bedside in no acute distress. Overnight, was started on clear liquid diet and reports he is tolerating it well this morning.     Objective     TEMPERATURE:  Current - Temp: 98.1 °F (36.7 °C); Max - Temp  Av.7 °F (36.5 °C)  Min: 97.1 °F (36.2 °C)  Max: 98.1 °F (36.7 °C)  RESPIRATIONS RANGE: Resp  Av.5  Min: 17  Max: 29  PULSE RANGE: Pulse  Av.8  Min: 79  Max: 95  BLOOD PRESSURE RANGE:  Systolic (24hrs), Av , Min:101 , Max:127   ; Diastolic (24hrs), Av, Min:59, Max:97    PULSE OXIMETRY RANGE: SpO2  Av.6 %  Min: 96 %  Max: 99 %    I & O - 24hr:    Intake/Output Summary (Last 24 hours) at 2024 1621  Last data filed at 2024 0712  Gross per 24 hour   Intake 3039.88 ml   Output 800 ml   Net 2239.88 ml     I/O last 3 completed shifts:  In: 3693.7 [P.O.:240; I.V.:20.3; Blood:830; IV Piggyback:2603.4]  Out: 1650 [Urine:1200; Emesis/NG output:450] I/O this shift:  In: 2799.9 [I.V.:2096.9; IV Piggyback:703]  Out: -    Weight change: -9.571 kg (-21 lb 1.6 oz)    Physical Exam:  General Appearance:    Alert, cooperative, no acute distress.   HEENT:    NC/AT, mucous membranes are moist   Neck:   Supple, no jugular venous distention.    Resp:     CTAB, No wheezes, No rhonchi, no use of accessory muscles   Heart:    RRR, S1 and S2 normal, no murmur, rub or gallop.    Abdomen:     Soft, non-tender, non-distended with normal bowel sounds   Extremities:   Atraumatic, no cyanosis or edema   Pulses:  Radial and pedal pulses are intact bilaterally   Neurologic:   AAOx3, anisocoria left pupil > right pupil (chronic)       Medications     Continuous Infusions:   sodium chloride      sodium chloride   [FreeTextEntry1] : The patient is a 29 year female. I have strongly recommended she follow up with her PCP and an ENdocrinologist for the elevated blood sugar levels . I have also recommended she continue the Xyzal 5 mg daily. I will evaluate  in 6 weeks to assess.\par

## 2024-04-21 NOTE — PLAN OF CARE
Problem: Discharge Planning  Goal: Discharge to home or other facility with appropriate resources  4/21/2024 1833 by Woodrow Vital RN  Outcome: Progressing     Problem: Safety - Adult  Goal: Free from fall injury  4/21/2024 1833 by Woodrow Vital RN  Outcome: Progressing     Problem: Pain  Goal: Verbalizes/displays adequate comfort level or baseline comfort level  4/21/2024 1833 by Woodrow Vital RN  Outcome: Progressing  Flowsheets (Taken 4/21/2024 1600)  Verbalizes/displays adequate comfort level or baseline comfort level:   Encourage patient to monitor pain and request assistance   Assess pain using appropriate pain scale   Administer analgesics based on type and severity of pain and evaluate response   Consider cultural and social influences on pain and pain management   Implement non-pharmacological measures as appropriate and evaluate response   Notify Licensed Independent Practitioner if interventions unsuccessful or patient reports new pain     Problem: Nutrition Deficit:  Goal: Optimize nutritional status  4/21/2024 1833 by Woodrow Vital RN  Outcome: Progressing     Problem: Skin/Tissue Integrity  Goal: Absence of new skin breakdown  Description: 1.  Monitor for areas of redness and/or skin breakdown  2.  Assess vascular access sites hourly  3.  Every 4-6 hours minimum:  Change oxygen saturation probe site  4.  Every 4-6 hours:  If on nasal continuous positive airway pressure, respiratory therapy assess nares and determine need for appliance change or resting period.  4/21/2024 1833 by Woodrow Vital RN  Outcome: Progressing     Problem: ABCDS Injury Assessment  Goal: Absence of physical injury  Outcome: Progressing

## 2024-04-21 NOTE — PLAN OF CARE
Problem: Discharge Planning  Goal: Discharge to home or other facility with appropriate resources  Outcome: Progressing     Problem: Safety - Adult  Goal: Free from fall injury  Outcome: Progressing     Problem: Pain  Goal: Verbalizes/displays adequate comfort level or baseline comfort level  Outcome: Progressing     Problem: Nutrition Deficit:  Goal: Optimize nutritional status  Outcome: Progressing     Problem: Skin/Tissue Integrity  Goal: Absence of new skin breakdown  Description: 1.  Monitor for areas of redness and/or skin breakdown  2.  Assess vascular access sites hourly  3.  Every 4-6 hours minimum:  Change oxygen saturation probe site  4.  Every 4-6 hours:  If on nasal continuous positive airway pressure, respiratory therapy assess nares and determine need for appliance change or resting period.  Outcome: Progressing

## 2024-04-22 ENCOUNTER — APPOINTMENT (OUTPATIENT)
Dept: GENERAL RADIOLOGY | Age: 69
DRG: 420 | End: 2024-04-22
Payer: MEDICARE

## 2024-04-22 LAB
ALBUMIN SERPL-MCNC: 2.2 G/DL (ref 3.5–5.2)
ALP SERPL-CCNC: 85 U/L (ref 40–129)
ALT SERPL-CCNC: 5 U/L (ref 0–40)
ANION GAP SERPL CALCULATED.3IONS-SCNC: 9 MMOL/L (ref 7–16)
AST SERPL-CCNC: 17 U/L (ref 0–39)
BASOPHILS # BLD: 0.01 K/UL (ref 0–0.2)
BASOPHILS NFR BLD: 0 % (ref 0–2)
BILIRUB DIRECT SERPL-MCNC: 1 MG/DL (ref 0–0.3)
BILIRUB INDIRECT SERPL-MCNC: 0.7 MG/DL (ref 0–1)
BILIRUB SERPL-MCNC: 1.7 MG/DL (ref 0–1.2)
BUN SERPL-MCNC: 14 MG/DL (ref 6–23)
CALCIUM SERPL-MCNC: 7.4 MG/DL (ref 8.6–10.2)
CHLORIDE SERPL-SCNC: 102 MMOL/L (ref 98–107)
CO2 SERPL-SCNC: 23 MMOL/L (ref 22–29)
CREAT SERPL-MCNC: 0.8 MG/DL (ref 0.7–1.2)
EOSINOPHIL # BLD: 0.08 K/UL (ref 0.05–0.5)
EOSINOPHILS RELATIVE PERCENT: 1 % (ref 0–6)
ERYTHROCYTE [DISTWIDTH] IN BLOOD BY AUTOMATED COUNT: 21.5 % (ref 11.5–15)
GFR SERPL CREATININE-BSD FRML MDRD: >90 ML/MIN/1.73M2
GLUCOSE BLD-MCNC: 106 MG/DL (ref 74–99)
GLUCOSE BLD-MCNC: 116 MG/DL (ref 74–99)
GLUCOSE BLD-MCNC: 151 MG/DL (ref 74–99)
GLUCOSE SERPL-MCNC: 140 MG/DL (ref 74–99)
HAV IGM SERPL QL IA: NONREACTIVE
HBV CORE IGM SERPL QL IA: NONREACTIVE
HBV SURFACE AG SERPL QL IA: NONREACTIVE
HCT VFR BLD AUTO: 34.4 % (ref 37–54)
HCV AB SERPL QL IA: NONREACTIVE
HGB BLD-MCNC: 10.3 G/DL (ref 12.5–16.5)
IMM GRANULOCYTES # BLD AUTO: 0.05 K/UL (ref 0–0.58)
IMM GRANULOCYTES NFR BLD: 1 % (ref 0–5)
INR PPP: 1.3
LYMPHOCYTES NFR BLD: 1.85 K/UL (ref 1.5–4)
LYMPHOCYTES RELATIVE PERCENT: 31 % (ref 20–42)
MAGNESIUM SERPL-MCNC: 1.9 MG/DL (ref 1.6–2.6)
MCH RBC QN AUTO: 23.2 PG (ref 26–35)
MCHC RBC AUTO-ENTMCNC: 29.9 G/DL (ref 32–34.5)
MCV RBC AUTO: 77.5 FL (ref 80–99.9)
MONOCYTES NFR BLD: 0.68 K/UL (ref 0.1–0.95)
MONOCYTES NFR BLD: 11 % (ref 2–12)
NEUTROPHILS NFR BLD: 55 % (ref 43–80)
NEUTS SEG NFR BLD: 3.29 K/UL (ref 1.8–7.3)
PATH REV BLD -IMP: NORMAL
PHOSPHATE SERPL-MCNC: 2.1 MG/DL (ref 2.5–4.5)
PLATELET # BLD AUTO: 156 K/UL (ref 130–450)
PMV BLD AUTO: 9.7 FL (ref 7–12)
POTASSIUM SERPL-SCNC: 3.8 MMOL/L (ref 3.5–5)
PROT SERPL-MCNC: 5.9 G/DL (ref 6.4–8.3)
PROTHROMBIN TIME: 14.6 SEC (ref 9.3–12.4)
RBC # BLD AUTO: 4.44 M/UL (ref 3.8–5.8)
RBC # BLD: ABNORMAL 10*6/UL
SODIUM SERPL-SCNC: 134 MMOL/L (ref 132–146)
WBC OTHER # BLD: 6 K/UL (ref 4.5–11.5)

## 2024-04-22 PROCEDURE — 6360000002 HC RX W HCPCS: Performed by: STUDENT IN AN ORGANIZED HEALTH CARE EDUCATION/TRAINING PROGRAM

## 2024-04-22 PROCEDURE — C9113 INJ PANTOPRAZOLE SODIUM, VIA: HCPCS

## 2024-04-22 PROCEDURE — 99291 CRITICAL CARE FIRST HOUR: CPT | Performed by: INTERNAL MEDICINE

## 2024-04-22 PROCEDURE — 99231 SBSQ HOSP IP/OBS SF/LOW 25: CPT | Performed by: INTERNAL MEDICINE

## 2024-04-22 PROCEDURE — 6370000000 HC RX 637 (ALT 250 FOR IP): Performed by: STUDENT IN AN ORGANIZED HEALTH CARE EDUCATION/TRAINING PROGRAM

## 2024-04-22 PROCEDURE — 82390 ASSAY OF CERULOPLASMIN: CPT

## 2024-04-22 PROCEDURE — 74018 RADEX ABDOMEN 1 VIEW: CPT

## 2024-04-22 PROCEDURE — 2580000003 HC RX 258

## 2024-04-22 PROCEDURE — 2580000003 HC RX 258: Performed by: STUDENT IN AN ORGANIZED HEALTH CARE EDUCATION/TRAINING PROGRAM

## 2024-04-22 PROCEDURE — 83735 ASSAY OF MAGNESIUM: CPT

## 2024-04-22 PROCEDURE — 97166 OT EVAL MOD COMPLEX 45 MIN: CPT

## 2024-04-22 PROCEDURE — 82103 ALPHA-1-ANTITRYPSIN TOTAL: CPT

## 2024-04-22 PROCEDURE — 82962 GLUCOSE BLOOD TEST: CPT

## 2024-04-22 PROCEDURE — 2060000000 HC ICU INTERMEDIATE R&B

## 2024-04-22 PROCEDURE — 97530 THERAPEUTIC ACTIVITIES: CPT

## 2024-04-22 PROCEDURE — 85610 PROTHROMBIN TIME: CPT

## 2024-04-22 PROCEDURE — 97535 SELF CARE MNGMENT TRAINING: CPT

## 2024-04-22 PROCEDURE — 80053 COMPREHEN METABOLIC PANEL: CPT

## 2024-04-22 PROCEDURE — 85025 COMPLETE CBC W/AUTO DIFF WBC: CPT

## 2024-04-22 PROCEDURE — 36592 COLLECT BLOOD FROM PICC: CPT

## 2024-04-22 PROCEDURE — 6360000002 HC RX W HCPCS

## 2024-04-22 PROCEDURE — 6370000000 HC RX 637 (ALT 250 FOR IP): Performed by: INTERNAL MEDICINE

## 2024-04-22 PROCEDURE — 84100 ASSAY OF PHOSPHORUS: CPT

## 2024-04-22 PROCEDURE — A4216 STERILE WATER/SALINE, 10 ML: HCPCS

## 2024-04-22 PROCEDURE — 97162 PT EVAL MOD COMPLEX 30 MIN: CPT

## 2024-04-22 PROCEDURE — 82784 ASSAY IGA/IGD/IGG/IGM EACH: CPT

## 2024-04-22 PROCEDURE — 2500000003 HC RX 250 WO HCPCS

## 2024-04-22 PROCEDURE — 82248 BILIRUBIN DIRECT: CPT

## 2024-04-22 PROCEDURE — 0HBRXZZ EXCISION OF TOE NAIL, EXTERNAL APPROACH: ICD-10-PCS | Performed by: PODIATRIST

## 2024-04-22 RX ORDER — ALBUMIN (HUMAN) 12.5 G/50ML
75 SOLUTION INTRAVENOUS
Status: ACTIVE | OUTPATIENT
Start: 2024-04-23 | End: 2024-04-24

## 2024-04-22 RX ADMIN — PIPERACILLIN AND TAZOBACTAM 4500 MG: 4; .5 INJECTION, POWDER, FOR SOLUTION INTRAVENOUS at 02:41

## 2024-04-22 RX ADMIN — VANCOMYCIN HYDROCHLORIDE 1250 MG: 10 INJECTION, POWDER, LYOPHILIZED, FOR SOLUTION INTRAVENOUS at 10:05

## 2024-04-22 RX ADMIN — SODIUM CHLORIDE, PRESERVATIVE FREE 10 ML: 5 INJECTION INTRAVENOUS at 10:13

## 2024-04-22 RX ADMIN — POLYETHYLENE GLYCOL-3350 AND ELECTROLYTES 4000 ML: 236; 6.74; 5.86; 2.97; 22.74 POWDER, FOR SOLUTION ORAL at 21:17

## 2024-04-22 RX ADMIN — PANTOPRAZOLE SODIUM 40 MG: 40 INJECTION, POWDER, FOR SOLUTION INTRAVENOUS at 06:03

## 2024-04-22 RX ADMIN — SODIUM CHLORIDE, PRESERVATIVE FREE 10 ML: 5 INJECTION INTRAVENOUS at 21:19

## 2024-04-22 RX ADMIN — PANTOPRAZOLE SODIUM 40 MG: 40 INJECTION, POWDER, FOR SOLUTION INTRAVENOUS at 19:03

## 2024-04-22 RX ADMIN — POTASSIUM PHOSPHATE, MONOBASIC AND POTASSIUM PHOSPHATE, DIBASIC 15 MMOL: 224; 236 INJECTION, SOLUTION, CONCENTRATE INTRAVENOUS at 10:08

## 2024-04-22 RX ADMIN — PIPERACILLIN AND TAZOBACTAM 4500 MG: 4; .5 INJECTION, POWDER, FOR SOLUTION INTRAVENOUS at 10:13

## 2024-04-22 RX ADMIN — FOLIC ACID 1 MG: 1 TABLET ORAL at 08:32

## 2024-04-22 ASSESSMENT — PAIN SCALES - GENERAL
PAINLEVEL_OUTOF10: 0

## 2024-04-22 NOTE — PROGRESS NOTES
OCCUPATIONAL THERAPY INITIAL EVALUATION    [facility]  [address], [place]       Date:2024                                                               Patient Name: Marcello Escudero  MRN: [id number]  : 1955  Room: [address] [place] [address]    Evaluating OT: Danielle Mcginnis, OTD,  OTR/L; [id number]    Referring Provider: Nicole Traylor MD    Specific Provider Orders/Date: OT eval and treat (24)       Diagnosis: Respiratory alkalosis [E87.3]  Pleural effusion [J90]  GI bleed [K92.2]  Demand ischemia (HCC) [I24.89]  Other ascites [R18.8]  Liver lesion [K76.9]  DONY (acute kidney injury) (HCC) [N17.9]  Acute respiratory failure with hypoxia (HCC) [J96.01]  Other cirrhosis of liver (HCC) [K74.69]  Gastrointestinal hemorrhage, unspecified gastrointestinal hemorrhage type [K92.2]  Pneumonia of both lower lobes due to infectious organism [J18.9]  Pneumonia due to infectious organism, unspecified laterality, unspecified part of lung [J18.9]     Reason for admission: Pt admitted with GI bleed, weakness, pneumonia.    Surgery/Procedures: None this admission     Pertinent Medical History:    No past medical history on file.     *Precautions:  Fall Risk, O2, NPO    Assessment of current deficits   [x] Functional mobility  [x]ADLs  [x] Strength               []Cognition   [x] Functional transfers   [x] IADLs         [x] Safety Awareness   [x]Endurance   [] Fine Coordination        [] ROM     [] Vision/perception   []Sensation    []Gross Motor Coordination [x] Balance   [] Delirium                  []Motor Control     [] Communication    OT PLAN OF CARE   OT POC based on physician orders, patient diagnosis and results of clinical assessment.       Frequency/Duration: 1-3 days/wk for 1-2 weeks PRN    Specific OT Treatment Interventions to include:   * Instruction/training on adapted ADL techniques and AE recommendations to increase functional independence

## 2024-04-22 NOTE — PROGRESS NOTES
Physical Therapy    Physical Therapy Initial Assessment     Name: Marcello Escudero  : 1955  MRN: [id number]      Date of Service: 2024    Evaluating PT:  Rg Doe, PT, DPT  [id number]     Room #:  4424/4424-A  Diagnosis:  Respiratory alkalosis [E87.3]  Pleural effusion [J90]  GI bleed [K92.2]  Demand ischemia (HCC) [I24.89]  Other ascites [R18.8]  Liver lesion [K76.9]  DONY (acute kidney injury) (HCC) [N17.9]  Acute respiratory failure with hypoxia (HCC) [J96.01]  Other cirrhosis of liver (HCC) [K74.69]  Gastrointestinal hemorrhage, unspecified gastrointestinal hemorrhage type [K92.2]  Pneumonia of both lower lobes due to infectious organism [J18.9]  Pneumonia due to infectious organism, unspecified laterality, unspecified part of lung [J18.9]  PMHx/PSHx:   has no past medical history on file.   Procedure/Surgery:  none   Precautions:  Falls, O2  Equipment Needs:  TBD    SUBJECTIVE:    Pt lives alone in a 1 story home with 4 stairs and 1 rail to enter.  Bedroom and bathroom are on the 1st level.  Pt ambulated with no AD PTA.    OBJECTIVE:   Initial Evaluation  Date: 24 Treatment Short Term/ Long Term   Goals   AM-PAC 6 Clicks      Was pt agreeable to Eval/treatment? Yes      Does pt have pain? No c/o pain     Bed Mobility  Rolling: NT  Supine to sit: Min A  Sit to supine: Min A  Scooting: Min A  Rolling: Independent   Supine to sit: Independent   Sit to supine: Independent   Scooting: Independent    Transfers Sit to stand: Min A  Stand to sit: Min A  Stand pivot: Min A  Sit to stand: Independent   Stand to sit: Independent   Stand pivot: Modified Independent  with AAD   Ambulation    25 feet with FWW Min A  >150 feet with AAD Modified Independent     Stair negotiation: ascended and descended  NT  >4 steps with 1 rail Modified Independent     ROM BUE:  Per OT eval   BLE:  WFL     Strength BUE:  Per OT eval   BLE:  WFL     Balance Sitting EOB:  SBA  Dynamic Standing:  Min A with FWW   Sitting EOB:  Safety and Education Training   [x] Patient/Caregiver Education   [x] HEP  [] Other     PT long term treatment goals are located in above grid    Frequency of treatments: 2-5x/week x 1-2 weeks.    Time in  1325  Time out  1358    Total Treatment Time  15 minutes     Evaluation Time includes thorough review of current medical information, gathering information on past medical history/social history and prior level of function, completion of standardized testing/informal observation of tasks, assessment of data and education on plan of care and goals.    CPT codes:  [] Low Complexity PT evaluation 32665  [x] Moderate Complexity PT evaluation 85838  [] High Complexity PT evaluation 01939  [] PT Re-evaluation 48539  [] Gait training 22901 -- minutes  [] Manual therapy 43606 -- minutes  [x] Therapeutic activities 31190 15 minutes  [] Therapeutic exercises 32362 - minutes  [] Neuromuscular reeducation 12638 -- minutes     Rg Doe, PT, DPT  [id number]          Suture Removal: 14 days

## 2024-04-22 NOTE — CONSULTS
[facility]   Gastroenterology, Hepatology, &  Advanced Endoscopy    Consult     Reason for consult: UGI bleed/Coffee ground emesis.    ASSESSMENT AND PLAN:    68y/M presents to the ED with progressively worsening fatigue and possible coffee-ground emesis and is found to have metastatic liver process with soft tissue mass in RUQ as well as likely carcinomatosis with underlying cirrhosis and new onset ascites.    PLAN:  Masses on Imaging:  - Will ask IR for liver biopsy and peritoneal biopsy for carcinomatosis.  - May require dedicated multi-phasic imaging with CTA or MRI but can hold for now.  - Will plan on EGD/Colonoscopy to assess for primary GI malignancy.  - Will plan on EUS with the other scopes if IR unable to obtain specimens.   - Send tumor markers.    2. Ascites:  - Will order for paracentesis with albumin replacement.  - Assess ascites for Cell Count with Diff, Albumin, Protein, and Cytology.   - Will need to begin diuretic regimen following paracentesis.    3. Coffee-Ground Emesis:  - Will plan on EGD for further evaluation.  - Will plan on EUS and Colonoscopy at the same time.   - Trend Hgb daily and transfuse to Hgb >7.   - PPI BID    4. Cirrhosis:  - Will perform serologic evaluation.  - Ascites management as above.    We will follow closely.     Thank you for including us in the care of this patient. Please do not hesitate to contact us with any additional questions or concerns.    Hayden Carvalho MD  Gastroenterology/Hepatology  Advanced Endoscopy        HISTORY OF PRESENT ILLNESS:      Mr. Marcello Escudero is a 68y/M who presents to the ED with progressively worsening fatigue, weakness, and emesis. He reports that symptoms have been present for the past month with worsening over the last few days. He reports associated emesis leading up to admission with description as coffee-ground. The patient denies every having had an EGD or Colonoscopy. He also denies any history of underlying liver disease.  mg at 04/21/24 1421    acetaminophen (TYLENOL) tablet 650 mg, 650 mg, Oral, Q6H PRN **OR** acetaminophen (TYLENOL) suppository 650 mg, 650 mg, Rectal, Q6H PRN, Nicci Noble MD    pantoprazole (PROTONIX) 40 mg in sodium chloride (PF) 0.9 % 10 mL injection, 40 mg, IntraVENous, Q12H, Nicci Noble MD, 40 mg at 04/22/24 0603    piperacillin-tazobactam (ZOSYN) 4,500 mg in sodium chloride 0.9 % 100 mL IVPB ([id number]), 4,500 mg, IntraVENous, Q8H, Mukul Newton MD, Last Rate: 25 mL/hr at 04/22/24 1013, 4,500 mg at 04/22/24 1013    vancomycin (VANCOCIN) 1,250 mg in sodium chloride 0.9 % 250 mL IVPB, 15 mg/kg, IntraVENous, Q12H, Nicci Noble MD, Last Rate: 166.7 mL/hr at 04/22/24 1005, 1,250 mg at 04/22/24 1005    0.9 % sodium chloride infusion, , IntraVENous, PRN, Siri Mckinnon DO     Allergies:  Patient has no known allergies.    ROS:  General: Patient denies n/v/f/c or weight loss. He has had weakness and fatigue.   HEENT: Patient denies persistent postnasal drip, scleral icterus, drooling, persistent bleeding from nose/mouth.  Resp: Patient denies SOB, wheezing, productive cough.  Cards: Patient denies CP, palpitations, significant edema  GI: As above.  Derm: Patient denies jaundice/rashes.   Musc: Patient denies diffuse/irregular joint swelling or myalgias.        Recent Labs     04/20/24  0012 04/20/24  0538 04/20/24  0729 04/21/24  0628 04/22/24  0540   INR 2.4  --   --  1.4 1.3   ALT  --   --  5 6 5   AST  --   --  20 20 17   ALKPHOS  --   --  88 83 85   BILITOT  --  1.8* 1.6* 1.5* 1.7*   BILIDIR  --  1.1* 1.0*  --  1.0*       Lab Results   Component Value Date    WBC 6.0 04/22/2024    HGB 10.3 (L) 04/22/2024    HCT 34.4 (L) 04/22/2024     04/22/2024     04/22/2024    K 3.8 04/22/2024     04/22/2024    CREATININE 0.8 04/22/2024    BUN 14 04/22/2024    CO2 23 04/22/2024    FOLATE 3.7 (L) 04/20/2024    [id number] 1374 (H) 04/20/2024    GLUCOSE 140 (H) 04/22/2024    INR 1.3 04/22/2024

## 2024-04-22 NOTE — PLAN OF CARE
Problem: Safety - Adult  Goal: Free from fall injury  4/22/2024 0054 by Lorenza Sousa, RN  Outcome: Progressing     Problem: Pain  Goal: Verbalizes/displays adequate comfort level or baseline comfort level  4/22/2024 0054 by Lorenza Sousa, RN  Outcome: Progressing     Problem: Skin/Tissue Integrity  Goal: Absence of new skin breakdown  Description: 1.  Monitor for areas of redness and/or skin breakdown  2.  Assess vascular access sites hourly  3.  Every 4-6 hours minimum:  Change oxygen saturation probe site  4.  Every 4-6 hours:  If on nasal continuous positive airway pressure, respiratory therapy assess nares and determine need for appliance change or resting period.  4/22/2024 0054 by Lorenza Sousa, RN  Outcome: Progressing

## 2024-04-22 NOTE — PROGRESS NOTES
Pharmacy Consultation Note  (Antibiotic Dosing and Monitoring)    Initial consult date: 4/20/24  Consulting physician/provider: Dr. Noble  Drug: Vancomycin  Indication: CAP/ Intra- abdominal    Age/  Gender Height Weight IBW  Allergy Information   68 y.o./male 188 cm (6' 2\") 104.3 kg (230 lb)     Ideal body weight: 82.2 kg (181 lb 3.5 oz)  Adjusted ideal body weight: 82.9 kg (182 lb 11.7 oz)   Patient has no known allergies.      Renal Function:  Recent Labs     04/20/24  0729 04/21/24  0628 04/21/24  1756   BUN 26* 18 17   CREATININE 1.2 0.9 0.9         Intake/Output Summary (Last 24 hours) at 4/22/2024 0210  Last data filed at 4/21/2024 1833  Gross per 24 hour   Intake 4065.39 ml   Output 300 ml   Net 3765.39 ml         Vancomycin Monitoring:  Trough:    Recent Labs     04/21/24  2036   VANCOTROUGH 16.1*     Random:  No results for input(s): \"VANCORANDOM\" in the last 72 hours.    Vancomycin Administration Times:  Recent vancomycin administrations                     vancomycin (VANCOCIN) 1,250 mg in sodium chloride 0.9 % 250 mL IVPB (mg) 1,250 mg New Bag 04/21/24 2141     1,250 mg New Bag  0858     1,250 mg New Bag 04/20/24 2045    vancomycin (VANCOCIN) 2,000 mg in sodium chloride 0.9 % 500 mL IVPB (mg) 2,000 mg New Bag 04/20/24 0051                      Assessment:  Patient is a 68 y.o. male who has been initiated on vancomycin  Estimated Creatinine Clearance: 91 mL/min (based on SCr of 0.9 mg/dL).  To dose vancomycin, pharmacy will be utilizing  trough based dosing  Vancomycin level 16.1.    Plan:  Received 2000 mg load.  Will continue  vancomycin 1250 mg IV every 12 hours  Will check vancomycin levels when appropriate  Will continue to monitor renal function   Pharmacy to follow    Thank you for your consult    Kenia Ram PharmD BCPS 4/22/2024 2:10 AM

## 2024-04-22 NOTE — PROGRESS NOTES
Pharmacy Consultation Note  (Antibiotic Dosing and Monitoring)    Initial consult date: 4/20/24  Consulting physician/provider: Dr. Noble  Drug: Vancomycin  Indication: CAP/ Intra- abdominal    Vancomycin has been discontinued. Clinical pharmacy will sign off, please reconsult if further assistance is needed.     Radha Jenkins, PharmD, BCPS, BCCCP 4/22/2024 3:27 PM

## 2024-04-22 NOTE — PROGRESS NOTES
[facility]   Department of Internal Medicine   Internal Medicine Residency  MICU Progress Note    Patient:  Marcello Escudero 68 y.o. male   MRN: [id number]       Date of Service: 2024   Admission date: 2024  8:14 PM ; Hospital day: 2   ICU day: 2d 4h    Allergy: Patient has no known allergies.    Subjective     Today: no acute events overnight    Patient was seen and examined at bedside. Calm, cooperative, says he does not have any concerns today. Denies nausea or vomiting.         Objective   PHYSICAL EXAM  General Appearance: Pale, cooperative  HEENT: Normocephalic  Neck: midline trachea  Lung: clear to auscultation bilaterally  Heart: regular rate and rhythm, no murmur  Abdomen: distended, soft, bowel sounds normal;  Extremities: no cyanosis or edema  Musculokeletal: No joint swelling  Neurologic: Mental status: Aox3. Anisocoria, R pupil myotic (pt says that it has been like that for many years, he said he had a retinal detachment, and other issues in his R eye that he did not get surgery for)    CARDIOVASCULAR  Pulse rate range      : Pulse  Av.4  Min: 81  Max: 94  BP range                  : Systolic (24hrs), Av , Min:101 , Max:130   ; Diastolic (24hrs), Av, Min:68, Max:97    Arterial BP       Systolic BP/Diastolic BP & MAP            PULMONARY  Respiration range   : Resp  Av.7  Min: 18  Max: 34  Pulse Ox range : SpO2  Av.4 %  Min: 94 %  Max: 98 %  Recent Labs     24  2304   PH 7.522*   PCO2 22.8*   PO2 106.1*   HCO3 18.3*   BE -3.2*   O2SAT 98.2   THB 10.5*           NEPHROLOGY (FLUIDS/ELECTROLYTES & NUTRITION)  Urine: 300 mL   I & O - 24hr:    Intake/Output Summary (Last 24 hours) at 2024 0753  Last data filed at 2024 0559  Gross per 24 hour   Intake 1125.51 ml   Output 0 ml   Net 1125.51 ml     Net IO Since Admission: 5,969.1 mL [24 0753]  Recent Labs     24  2304 24  0729 24  0628 24  1756 24  0540   CREATININE

## 2024-04-22 NOTE — CONSULTS
Department of Podiatry   Consult Note        Reason for Consult:  Nail Care    CHIEF COMPLAINT:  Painful Toenails    HISTORY OF PRESENT ILLNESS:      Mr. Escudero is a 68 y.o. male with no significant past medical history on file. Podiatry consulted for nail care. Patient states the he has long, painful, toenails that he is unable to trim on his on. He would like to have them trimmed today. Patient denies any N/V/D/F/C/SOB/CP and has no other pedal complaints at this time.     Past Medical History:    No past medical history on file.    Past Surgical History:        Procedure Laterality Date    EYE SURGERY      lens implant rt eye       Medications Prior to Admission:    No medications prior to admission.    Allergies:  Patient has no known allergies.    Social History:   TOBACCO:   reports that he has quit smoking. His smoking use included cigarettes. He has a 20.0 pack-year smoking history. He has never used smokeless tobacco.  ETOH:   reports no history of alcohol use.  DRUGS:   Social History     Substance and Sexual Activity   Drug Use No       Family History:       Problem Relation Age of Onset    Diabetes Father          REVIEW OF SYSTEMS:    All pertinent positives and negatives as noted in HPI       LOWER EXTREMITY EXAMINATION     VASCULAR:  DP and PT pulses are palpable. CFT < 5 seconds B/L.  Warm to warm from the tibial tuberosity to the distal aspect of the digits dorsally. Hair growth noted to the distal aspects dorsally.    NEUROLOGIC:  Protective sensation is diminished but grossly intact    DERM:  Skin is intact and xerotic to the bilateral lower extremities. No Edema, No Erythema, No Hyperkeratotic tissue noted.  Toenails 1-5 b/l are abnormal in thickness, color and length. Webspaces 1-4 b/l are C/D/I.    MUSCULOSKELETAL: No deformities noted.  5/5 Gross Muscle strength in all 4 quadrants.       CONSULTS:  IP CONSULT TO GENERAL SURGERY  IP CONSULT TO GI  IP CONSULT TO CRITICAL CARE  PHARMACY TO DOSE

## 2024-04-22 NOTE — PROGRESS NOTES
[facility] Hospitalist Progress Note    SYNOPSIS: Patient admitted on 2024 for GI bleed    Mr. Marcello Escudero, a 68 y.o. year old male  who  has no past medical history on file.  Who presented to ED over complaints of generalized fatigue and emesis.  Patient states the complaint has been ongoing for the last month and worsened over the last few days and had dark coffee-ground emesis earlier yesterday evening.  Per chart review, there was also concern for of patient having seizure-like activity per EMS.  Hospital course thus far remarkable for patient with hypokalemia, lactic acidosis lactic acid 4.3, hemoglobin 6.1.  CT of abdomen with significant ascites and concern for carcinomatosis, air in gallbladder, bilateral pleural effusions.  General surgery was consulted who evaluated patient and recommended GI consult for possible variceal bleeding and new onset cirrhosis. Patient did receive packed red blood cells, Protonix, vancomycin and Zosyn due to possible pneumonia as well.  Patient was admitted to medicine with consults placed for critical care and GI. Oncology also consulted. There is high concern for neoplastic process. IR consulted for liver biopsy and paracentesis.     SUBJECTIVE:  Stable overnight. No other overnight issues reported.   Records reviewed.       Temp (24hrs), Av.8 °F (36.6 °C), Min:97.6 °F (36.4 °C), Max:98.1 °F (36.7 °C)    DIET: Diet NPO  CODE: Full Code    Intake/Output Summary (Last 24 hours) at 2024 1257  Last data filed at 2024 0559  Gross per 24 hour   Intake 1125.51 ml   Output 0 ml   Net 1125.51 ml         Review of Systems  All bolded are positive; please see HPI  General:  Fever, chills, diaphoresis, fatigue, malaise, night sweats, weight loss  Psychological:  Anxiety, disorientation, hallucinations.  ENT:  Epistaxis, headaches, vertigo, visual changes.  Cardiovascular:  Chest pain, irregular heartbeats, palpitations, paroxysmal nocturnal  dyspnea.  Respiratory:  Shortness of breath, coughing, sputum production, hemoptysis, wheezing, orthopnea.  Gastrointestinal:  Nausea, vomiting, diarrhea, heartburn, constipation, abdominal pain, hematemesis, hematochezia, melena, acholic stools  Genito-Urinary:  Dysuria, urgency, frequency, hematuria  Musculoskeletal:  Joint pain, joint stiffness, joint swelling, muscle pain  Neurology:  Headache, focal neurological deficits, weakness, numbness, paresthesia  Derm:  Rashes, ulcers, excoriations, bruising  Extremities:  Decreased ROM, peripheral edema, mottling      OBJECTIVE:    /74   Pulse 87   Temp 97.6 °F (36.4 °C) (Temporal)   Resp 26   Ht 1.88 m (6' 2\")   Wt 83.9 kg (185 lb)   SpO2 97%   BMI 23.75 kg/m²     General appearance:  awake, alert, and oriented to person, place, time, and purpose; appears stated age and cooperative; no apparent distress no labored breathing  HEENT:  Conjunctivae/corneas clear.   Neck: Supple. No jugular venous distention.   Respiratory: symmetrical; clear to auscultation bilaterally; no wheezes; no rhonchi; no rales  Cardiovascular: rhythm regular; rate controlled; no murmurs  Abdomen: Soft, nontender, nondistended  Extremities:  peripheral pulses present; no peripheral edema; no ulcers  Musculoskeletal: No clubbing, cyanosis, no bilateral lower extremity edema. Brisk capillary refill.   Skin:  No rashes  on visible skin  Neurologic: awake, alert and following commands     ASSESSMENT and PLAN:    Acute blood loss anemia due to GI bleed  Peritoneal nodularity concerning for carcinomatosis   Hepatic lesions concerning for metastatic disease  Bilateral pleural effusions  Cirrhosis with ascites   Soft tissue mass area in the right abdomen abutting the   duodenum and cecum suspicious for malignancy  Acute hypoxic respiratory failure secondary to CAP    Hypokalemia   Management per critical care   Continue antibiotics Vanc and Zosyn  Continue PPI   Oncology following  IR

## 2024-04-22 NOTE — CARE COORDINATION
Care Coordination: LOS 2 day. Presented to ed with fatigue and weakness, admitted MICU Anemia 2/2 GIB, hepatic lesions concerning for metastatic disease, bilateral pleural effusions, acute hypoxic resp failure.  Hem/onc and surgery following.  IR for paracentesis, thoracentesis and liver bx.  Met with pt at bedside to discuss transition of care.  States he lives alone, one story home, 3-4 steps to enter. Brother lives next door. He is not  and has no children. No hx of hhc, dme or FAWN. If dme is needed, no preference. Pt has NO PCP, would like to be set up with pcp prior to dc but requests close to home. He is able to drive. Brother would transport at discharge. He is unclear on discharge needs but prefers home at discharge.  States his brother would be emergency contact as mom is too elderly. Spiritual care, Miley Waldrop and he will follow up with POA papers for pt.      Electronically signed by Sury Stuart RN on 4/22/2024 at 11:56 AM     The Plan for Transition of Care is related to the following treatment goals: dc    The Patient was provided with a choice of provider and agrees   with the discharge plan. [x] Yes [] No    Freedom of choice list was provided with basic dialogue that supports the patient's individualized plan of care/goals, treatment preferences and shares the quality data associated with the providers. [x] Yes [] No

## 2024-04-22 NOTE — PROGRESS NOTES
Spoke with RN regarding patient's ordered para. Please keep patient NPO after midnight, and hold all thinners for procedure. Per bedside RN Patient is able to sign consent.

## 2024-04-22 NOTE — ACP (ADVANCE CARE PLANNING)
Advance Care Planning   Healthcare Decision Maker:    Primary Decision Maker: micah Escudero - Brother/Sister - [phone]    Spiritual care to come and fill out POA papers for Brother as primary- pt feels mother is unable.    Click here to complete Healthcare Decision Makers including selection of the Healthcare Decision Maker Relationship (ie \"Primary\").

## 2024-04-22 NOTE — ACP (ADVANCE CARE PLANNING)
Talked with patient in the morning. He was agreeable to completing an ACP. I returned in the afternoon with the forms. He then said he wanted to wait until his brother could be with him to complete the form.

## 2024-04-23 ENCOUNTER — ANESTHESIA (OUTPATIENT)
Dept: ENDOSCOPY | Age: 69
End: 2024-04-23
Payer: MEDICARE

## 2024-04-23 ENCOUNTER — ANESTHESIA EVENT (OUTPATIENT)
Dept: ENDOSCOPY | Age: 69
End: 2024-04-23
Payer: MEDICARE

## 2024-04-23 ENCOUNTER — APPOINTMENT (OUTPATIENT)
Dept: GENERAL RADIOLOGY | Age: 69
DRG: 420 | End: 2024-04-23
Payer: MEDICARE

## 2024-04-23 PROBLEM — D64.9 ANEMIA, UNSPECIFIED: Status: ACTIVE | Noted: 2024-04-19

## 2024-04-23 LAB
AFP SERPL-MCNC: 2.3 UG/L
ALBUMIN SERPL-MCNC: 2.4 G/DL (ref 3.5–5.2)
ALP SERPL-CCNC: 106 U/L (ref 40–129)
ALT SERPL-CCNC: 5 U/L (ref 0–40)
ANION GAP SERPL CALCULATED.3IONS-SCNC: 12 MMOL/L (ref 7–16)
AST SERPL-CCNC: 18 U/L (ref 0–39)
BASOPHILS # BLD: 0.02 K/UL (ref 0–0.2)
BASOPHILS NFR BLD: 0 % (ref 0–2)
BILIRUB DIRECT SERPL-MCNC: 1.2 MG/DL (ref 0–0.3)
BILIRUB INDIRECT SERPL-MCNC: 0.7 MG/DL (ref 0–1)
BILIRUB SERPL-MCNC: 1.9 MG/DL (ref 0–1.2)
BNP SERPL-MCNC: 624 PG/ML (ref 0–125)
BUN SERPL-MCNC: 13 MG/DL (ref 6–23)
CALCIUM SERPL-MCNC: 7.7 MG/DL (ref 8.6–10.2)
CANCER AG19-9 SERPL IA-ACNC: <2 U/ML (ref 0–35)
CHLORIDE SERPL-SCNC: 105 MMOL/L (ref 98–107)
CO2 SERPL-SCNC: 19 MMOL/L (ref 22–29)
CREAT SERPL-MCNC: 0.7 MG/DL (ref 0.7–1.2)
EOSINOPHIL # BLD: 0.04 K/UL (ref 0.05–0.5)
EOSINOPHILS RELATIVE PERCENT: 1 % (ref 0–6)
ERYTHROCYTE [DISTWIDTH] IN BLOOD BY AUTOMATED COUNT: 22 % (ref 11.5–15)
GFR SERPL CREATININE-BSD FRML MDRD: >90 ML/MIN/1.73M2
GLUCOSE BLD-MCNC: 108 MG/DL (ref 74–99)
GLUCOSE BLD-MCNC: 116 MG/DL (ref 74–99)
GLUCOSE SERPL-MCNC: 134 MG/DL (ref 74–99)
HCT VFR BLD AUTO: 38 % (ref 37–54)
HGB BLD-MCNC: 11.3 G/DL (ref 12.5–16.5)
IGG SERPL-MCNC: 1590 MG/DL (ref 700–1600)
IGM SERPL-MCNC: 154 MG/DL (ref 40–230)
IMM GRANULOCYTES # BLD AUTO: 0.1 K/UL (ref 0–0.58)
IMM GRANULOCYTES NFR BLD: 1 % (ref 0–5)
INR PPP: 1.3
LYMPHOCYTES NFR BLD: 1.72 K/UL (ref 1.5–4)
LYMPHOCYTES RELATIVE PERCENT: 22 % (ref 20–42)
MAGNESIUM SERPL-MCNC: 1.9 MG/DL (ref 1.6–2.6)
MCH RBC QN AUTO: 23 PG (ref 26–35)
MCHC RBC AUTO-ENTMCNC: 29.7 G/DL (ref 32–34.5)
MCV RBC AUTO: 77.2 FL (ref 80–99.9)
MONOCYTES NFR BLD: 0.81 K/UL (ref 0.1–0.95)
MONOCYTES NFR BLD: 10 % (ref 2–12)
NEUTROPHILS NFR BLD: 66 % (ref 43–80)
NEUTS SEG NFR BLD: 5.22 K/UL (ref 1.8–7.3)
PHOSPHATE SERPL-MCNC: 2.5 MG/DL (ref 2.5–4.5)
PLATELET # BLD AUTO: 188 K/UL (ref 130–450)
PMV BLD AUTO: 9.4 FL (ref 7–12)
POTASSIUM SERPL-SCNC: 3.9 MMOL/L (ref 3.5–5)
PROT SERPL-MCNC: 6.6 G/DL (ref 6.4–8.3)
PROTHROMBIN TIME: 14.4 SEC (ref 9.3–12.4)
RBC # BLD AUTO: 4.92 M/UL (ref 3.8–5.8)
SODIUM SERPL-SCNC: 136 MMOL/L (ref 132–146)
WBC OTHER # BLD: 7.9 K/UL (ref 4.5–11.5)

## 2024-04-23 PROCEDURE — 6360000002 HC RX W HCPCS

## 2024-04-23 PROCEDURE — 82962 GLUCOSE BLOOD TEST: CPT

## 2024-04-23 PROCEDURE — C9113 INJ PANTOPRAZOLE SODIUM, VIA: HCPCS

## 2024-04-23 PROCEDURE — 85610 PROTHROMBIN TIME: CPT

## 2024-04-23 PROCEDURE — 83880 ASSAY OF NATRIURETIC PEPTIDE: CPT

## 2024-04-23 PROCEDURE — 71045 X-RAY EXAM CHEST 1 VIEW: CPT

## 2024-04-23 PROCEDURE — 83735 ASSAY OF MAGNESIUM: CPT

## 2024-04-23 PROCEDURE — 82248 BILIRUBIN DIRECT: CPT

## 2024-04-23 PROCEDURE — A4216 STERILE WATER/SALINE, 10 ML: HCPCS

## 2024-04-23 PROCEDURE — 6370000000 HC RX 637 (ALT 250 FOR IP): Performed by: INTERNAL MEDICINE

## 2024-04-23 PROCEDURE — 99232 SBSQ HOSP IP/OBS MODERATE 35: CPT | Performed by: STUDENT IN AN ORGANIZED HEALTH CARE EDUCATION/TRAINING PROGRAM

## 2024-04-23 PROCEDURE — 84100 ASSAY OF PHOSPHORUS: CPT

## 2024-04-23 PROCEDURE — 2580000003 HC RX 258

## 2024-04-23 PROCEDURE — 99291 CRITICAL CARE FIRST HOUR: CPT | Performed by: INTERNAL MEDICINE

## 2024-04-23 PROCEDURE — 97530 THERAPEUTIC ACTIVITIES: CPT

## 2024-04-23 PROCEDURE — 85025 COMPLETE CBC W/AUTO DIFF WBC: CPT

## 2024-04-23 PROCEDURE — 2060000000 HC ICU INTERMEDIATE R&B

## 2024-04-23 PROCEDURE — 6360000002 HC RX W HCPCS: Performed by: STUDENT IN AN ORGANIZED HEALTH CARE EDUCATION/TRAINING PROGRAM

## 2024-04-23 PROCEDURE — 2700000000 HC OXYGEN THERAPY PER DAY

## 2024-04-23 PROCEDURE — 80053 COMPREHEN METABOLIC PANEL: CPT

## 2024-04-23 RX ORDER — ENOXAPARIN SODIUM 100 MG/ML
40 INJECTION SUBCUTANEOUS DAILY
Status: DISCONTINUED | OUTPATIENT
Start: 2024-04-23 | End: 2024-04-26 | Stop reason: HOSPADM

## 2024-04-23 RX ORDER — MAGNESIUM SULFATE 1 G/100ML
1000 INJECTION INTRAVENOUS ONCE
Status: COMPLETED | OUTPATIENT
Start: 2024-04-23 | End: 2024-04-23

## 2024-04-23 RX ORDER — POTASSIUM CHLORIDE 7.45 MG/ML
10 INJECTION INTRAVENOUS ONCE
Status: COMPLETED | OUTPATIENT
Start: 2024-04-23 | End: 2024-04-23

## 2024-04-23 RX ADMIN — POTASSIUM CHLORIDE 10 MEQ: 7.46 INJECTION, SOLUTION INTRAVENOUS at 09:18

## 2024-04-23 RX ADMIN — MAGNESIUM SULFATE HEPTAHYDRATE 1000 MG: 1 INJECTION, SOLUTION INTRAVENOUS at 09:14

## 2024-04-23 RX ADMIN — SODIUM CHLORIDE, PRESERVATIVE FREE 10 ML: 5 INJECTION INTRAVENOUS at 21:36

## 2024-04-23 RX ADMIN — PANTOPRAZOLE SODIUM 40 MG: 40 INJECTION, POWDER, FOR SOLUTION INTRAVENOUS at 17:20

## 2024-04-23 RX ADMIN — ENOXAPARIN SODIUM 40 MG: 100 INJECTION SUBCUTANEOUS at 15:26

## 2024-04-23 RX ADMIN — POLYETHYLENE GLYCOL 3350 17 G: 17 POWDER, FOR SOLUTION ORAL at 21:37

## 2024-04-23 RX ADMIN — SODIUM CHLORIDE, PRESERVATIVE FREE 10 ML: 5 INJECTION INTRAVENOUS at 09:22

## 2024-04-23 RX ADMIN — PANTOPRAZOLE SODIUM 40 MG: 40 INJECTION, POWDER, FOR SOLUTION INTRAVENOUS at 06:27

## 2024-04-23 ASSESSMENT — PAIN SCALES - GENERAL
PAINLEVEL_OUTOF10: 0
PAINLEVEL_OUTOF10: 0

## 2024-04-23 NOTE — PROGRESS NOTES
[facility]   Department of Internal Medicine   Internal Medicine Residency  MICU Progress Note    Patient:  Marcello Escudero 68 y.o. male   MRN: [id number]       Date of Service: 2024   Admission date: 2024  8:14 PM ; Hospital day: 3   ICU day: 3d 5h    Allergy: Patient has no known allergies.    Subjective     Today:   \"vomiting dark green emesis. Patient breathing rhonchorus to auscultation. RN performed Nasotracheal Suctioning and pulled 15mL of dark green bile from patients airway. RN placed Nasogastric tube to low intermittent suction and 1000 mL of dark green liquid was pulled out within 10 minutes\"  (0.4) Cr.0.7     Patient was seen and examined at bedside. Calm, cooperative, says he does not have any concerns today.        Objective   PHYSICAL EXAM  General Appearance: Pale, cooperative  HEENT: Normocephalic  Neck: midline trachea  Lung: clear to auscultation bilaterally  Heart: regular rate and rhythm, no murmur  Abdomen: distended, soft, bowel sounds normal;  Extremities: no cyanosis or edema  Musculokeletal: No joint swelling  Neurologic: Mental status: Aox3. Anisocoria, R pupil myotic (pt says that it has been like that for many years, he said he had a retinal detachment, and other issues in his R eye that he did not get surgery for)    CARDIOVASCULAR  Pulse rate range      : Pulse  Av.7  Min: 86  Max: 109  BP range                  : Systolic (24hrs), Av , Min:94 , Max:144   ; Diastolic (24hrs), Av, Min:65, Max:89    Arterial BP       Systolic BP/Diastolic BP & MAP            PULMONARY  Respiration range   : Resp  Av.5  Min: 21  Max: 32  Pulse Ox range : SpO2  Av.4 %  Min: 89 %  Max: 97 %  No results for input(s): \"PH\", \"PCO2\", \"PO2\", \"HCO3\", \"BE\", \"O2SAT\", \"THB\" in the last 72 hours.    Invalid input(s): \"PFRATIO\"          NEPHROLOGY (FLUIDS/ELECTROLYTES & NUTRITION)  Urine: 350 mL   I & O - 24hr:    Intake/Output Summary (Last 24 hours) at 2024  the   duodenum and cecum suspicious for malignancy and may be contributing to   obstruction; high attenuation sigmoid colon foci consistent with blood in   conjunction with the history.  Consider enteric contrasted CT to further   evaluate.   5. Gallbladder air, and apparent adjacent external focus raising the   possibility of perforation or fistula to the right colon.   Results were discussed with the ordering physician 4/19/2024 at 22:37.         CT CERVICAL SPINE WO CONTRAST   Final Result   1. No acute fracture or traumatic malalignment of the cervical spine.   2. Multilevel discogenic changes throughout the cervical spine most   pronounced at the C5-6 and C6-7 levels.   3. Moderate left-sided pleural effusion.         CTA CHEST W CONTRAST   Final Result   1. Findings suggestive of atypical pulmonary infiltrates as described and   atelectasis, with pulmonary edema not excluded   2. Moderate right and moderate to large left pleural effusions   3. No acute pulmonary embolus identified         XR CHEST PORTABLE   Final Result   1. Bilateral lower lobe airspace disease/pneumonia   2. Possible left pleural effusion.   3. CT of the chest is recommended.         CT NEEDLE BIOPSY LIVER PERCUTANEOUS    (Results Pending)   IR US GUIDED PARACENTESIS    (Results Pending)   XR CHEST PORTABLE    (Results Pending)        Resident's Assessment and Plan     Assessment and Plan:    Assessment:  Anemia 2/2 consistent with AOCD   Peritoneal nodularity concerning for carcinomatosis  CEA 2.5 wnl  Pending: chromograin A, AFP, CA 19-9  Hepatic lesions concerning for metastatic disease. MELD 19 on admission    Bilateral pleural effusions  Ascites   Soft tissue mass area in the right abdomen abutting the duodenum and cecum suspicious for malignancy   Acute hypoxic respiratory failure   Concern for pneumonia   CTA chest atypical pulmonary infiltrates  Concern for GI bleed - resolved  DONY - resolved  HAGMA 2/2 lactic acidosis -

## 2024-04-23 NOTE — PLAN OF CARE
Problem: Discharge Planning  Goal: Discharge to home or other facility with appropriate resources  4/23/2024 1657 by Woodrow Vital RN  Outcome: Progressing     Problem: Safety - Adult  Goal: Free from fall injury  4/23/2024 1657 by Woodrow Vital RN  Outcome: Progressing     Problem: Pain  Goal: Verbalizes/displays adequate comfort level or baseline comfort level  4/23/2024 1657 by Woodrow Vital RN  Outcome: Progressing     Problem: Nutrition Deficit:  Goal: Optimize nutritional status  4/23/2024 1657 by Woodrow Vital RN  Outcome: Progressing     Problem: Skin/Tissue Integrity  Goal: Absence of new skin breakdown  Description: 1.  Monitor for areas of redness and/or skin breakdown  2.  Assess vascular access sites hourly  3.  Every 4-6 hours minimum:  Change oxygen saturation probe site  4.  Every 4-6 hours:  If on nasal continuous positive airway pressure, respiratory therapy assess nares and determine need for appliance change or resting period.  4/23/2024 1657 by Woodrow Vital RN  Outcome: Progressing     Problem: ABCDS Injury Assessment  Goal: Absence of physical injury  4/23/2024 1657 by Woodrow Vital RN  Outcome: Progressing

## 2024-04-23 NOTE — PROGRESS NOTES
RN walked into patient room to see Patient vomitting dark green emesis. Patient breathing rhonchorus to auscultation. RN performed Nasotracheal Suctioning and pulled 15mL of dark green bile from patients airway. RN placed Nasogastric tube to low intermittent suction and 1000 mL of dark green liquid was pulled out within 10 minutes. Resident was notified and at bedside for entire length of event. Prior to the occurrence, patient had consumed 2000mL of polyethylene glycol (GoLYTELY).

## 2024-04-23 NOTE — PLAN OF CARE
Problem: Discharge Planning  Goal: Discharge to home or other facility with appropriate resources  Outcome: Progressing     Problem: Safety - Adult  Goal: Free from fall injury  Outcome: Progressing  Flowsheets (Taken 4/23/2024 0800)  Free From Fall Injury:   Instruct family/caregiver on patient safety   Based on caregiver fall risk screen, instruct family/caregiver to ask for assistance with transferring infant if caregiver noted to have fall risk factors     Problem: Pain  Goal: Verbalizes/displays adequate comfort level or baseline comfort level  Outcome: Progressing  Flowsheets (Taken 4/23/2024 0800)  Verbalizes/displays adequate comfort level or baseline comfort level:   Encourage patient to monitor pain and request assistance   Assess pain using appropriate pain scale   Administer analgesics based on type and severity of pain and evaluate response   Implement non-pharmacological measures as appropriate and evaluate response   Consider cultural and social influences on pain and pain management   Notify Licensed Independent Practitioner if interventions unsuccessful or patient reports new pain     Problem: Nutrition Deficit:  Goal: Optimize nutritional status  Outcome: Progressing     Problem: Skin/Tissue Integrity  Goal: Absence of new skin breakdown  Description: 1.  Monitor for areas of redness and/or skin breakdown  2.  Assess vascular access sites hourly  3.  Every 4-6 hours minimum:  Change oxygen saturation probe site  4.  Every 4-6 hours:  If on nasal continuous positive airway pressure, respiratory therapy assess nares and determine need for appliance change or resting period.  Outcome: Progressing     Problem: ABCDS Injury Assessment  Goal: Absence of physical injury  Outcome: Progressing  Flowsheets (Taken 4/23/2024 0800)  Absence of Physical Injury: Implement safety measures based on patient assessment

## 2024-04-23 NOTE — PROGRESS NOTES
[facility] Hospitalist Progress Note    SYNOPSIS: Patient admitted on 2024 for GI bleed  68 y.o. year old male who has no past medical history on file. Who presented to ED over complaints of generalized fatigue and emesis. Patient states the complaint has been ongoing for the last month and worsened over the last few days and had dark coffee-ground emesis earlier yesterday evening. Per chart review, there was also concern for of patient having seizure-like activity per EMS. Hospital course thus far remarkable for patient with hypokalemia, lactic acidosis lactic acid 4.3, hemoglobin 6.1. CT of abdomen with significant ascites and concern for carcinomatosis, air in gallbladder, bilateral pleural effusions. General surgery was consulted who evaluated patient and recommended GI consult for possible variceal bleeding and new onset cirrhosis. Patient did receive packed red blood cells, Protonix, vancomycin and Zosyn due to possible pneumonia as well. Patient was admitted to medicine with consults placed for critical care and GI. Oncology also consulted. There is high concern for neoplastic process. IR consulted for liver biopsy and paracentesis. And thoracocentesis  -complains of dry cough   Transfer order to floor placed in ; still in ICU    SUBJECTIVE:  Stable overnight. No other overnight issues reported.   Patient seen and examined  Records reviewed.           Temp (24hrs), Av.6 °F (36.4 °C), Min:97.1 °F (36.2 °C), Max:97.8 °F (36.6 °C)    DIET: Diet NPO Exceptions are: Sips of Water with Meds  CODE: Full Code    Intake/Output Summary (Last 24 hours) at 2024 1638  Last data filed at 2024 1400  Gross per 24 hour   Intake 954.54 ml   Output 2250 ml   Net -1295.46 ml       Review of Systems  All bolded are positive; please see HPI  General:  Fever, chills, diaphoresis, fatigue, malaise, night sweats, weight loss  Psychological:  Anxiety, disorientation, hallucinations.  ENT:  Epistaxis,

## 2024-04-23 NOTE — PROGRESS NOTES
Physical Therapy    Physical Therapy Treatment    Name: Marcello Escudero  : 1955  MRN: [id number]      Date of Service: 2024    Evaluating PT:  Rg Doe, PT, DPT  [id number]     Room #:  4424/4424-A  Diagnosis:  Respiratory alkalosis [E87.3]  Pleural effusion [J90]  GI bleed [K92.2]  Demand ischemia (HCC) [I24.89]  Other ascites [R18.8]  Liver lesion [K76.9]  DONY (acute kidney injury) (HCC) [N17.9]  Acute respiratory failure with hypoxia (HCC) [J96.01]  Other cirrhosis of liver (HCC) [K74.69]  Gastrointestinal hemorrhage, unspecified gastrointestinal hemorrhage type [K92.2]  Pneumonia of both lower lobes due to infectious organism [J18.9]  Pneumonia due to infectious organism, unspecified laterality, unspecified part of lung [J18.9]  PMHx/PSHx:   has no past medical history on file.   Procedure/Surgery:  none   Precautions:  Falls, O2, NG to suction  Equipment Needs:  TBD    SUBJECTIVE:    Pt lives alone in a 1 story home with 4 stairs and 1 rail to enter.  Bedroom and bathroom are on the 1st level.  Pt ambulated with no AD PTA.    OBJECTIVE:   Initial Evaluation  Date: 24 Treatment  Date: 24 Short Term/ Long Term   Goals   AM-PAC 6 Clicks     Was pt agreeable to Eval/treatment? Yes  Yes     Does pt have pain? No c/o pain No c/o pain    Bed Mobility  Rolling: NT  Supine to sit: Min A  Sit to supine: Min A  Scooting: Min A Rolling: NT  Supine to sit: Min A  Sit to supine: Min A  Scooting: Min A Rolling: Independent   Supine to sit: Independent   Sit to supine: Independent   Scooting: Independent    Transfers Sit to stand: Min A  Stand to sit: Min A  Stand pivot: Min A Sit to stand: Min A  Stand to sit: Min A  Stand pivot: Min A Sit to stand: Independent   Stand to sit: Independent   Stand pivot: Modified Independent  with AAD   Ambulation    25 feet with FWW Min A Few feet bed to chair with FWW Ginger      Limited by NG to suction >150 feet with AAD Modified Independent     Stair

## 2024-04-24 ENCOUNTER — APPOINTMENT (OUTPATIENT)
Dept: INTERVENTIONAL RADIOLOGY/VASCULAR | Age: 69
DRG: 420 | End: 2024-04-24
Payer: MEDICARE

## 2024-04-24 ENCOUNTER — APPOINTMENT (OUTPATIENT)
Dept: CT IMAGING | Age: 69
DRG: 420 | End: 2024-04-24
Payer: MEDICARE

## 2024-04-24 PROBLEM — R18.8 OTHER ASCITES: Status: ACTIVE | Noted: 2024-04-24

## 2024-04-24 PROBLEM — K76.9 LIVER LESION: Status: ACTIVE | Noted: 2024-04-24

## 2024-04-24 PROBLEM — K74.69 OTHER CIRRHOSIS OF LIVER (HCC): Status: ACTIVE | Noted: 2024-04-24

## 2024-04-24 LAB
A1AT SERPL-MCNC: 283 MG/DL (ref 90–200)
ALBUMIN FLD-MCNC: 1.8 G/DL
ALBUMIN SERPL-MCNC: 2.4 G/DL (ref 3.5–5.2)
ALP SERPL-CCNC: 93 U/L (ref 40–129)
ALT SERPL-CCNC: 6 U/L (ref 0–40)
AMYLASE FLD-CCNC: 33 U/L
ANION GAP SERPL CALCULATED.3IONS-SCNC: 14 MMOL/L (ref 7–16)
AST SERPL-CCNC: 18 U/L (ref 0–39)
ATYPICAL LYMPHOCYTE ABSOLUTE COUNT: 0.04 K/UL (ref 0–0.46)
ATYPICAL LYMPHOCYTES: 1 % (ref 0–4)
BASOPHILS # BLD: 0 K/UL (ref 0–0.2)
BASOPHILS NFR BLD: 0 % (ref 0–2)
BILIRUB DIRECT SERPL-MCNC: 1.2 MG/DL (ref 0–0.3)
BILIRUB INDIRECT SERPL-MCNC: 0.5 MG/DL (ref 0–1)
BILIRUB SERPL-MCNC: 1.7 MG/DL (ref 0–1.2)
BUN SERPL-MCNC: 12 MG/DL (ref 6–23)
CALCIUM SERPL-MCNC: 8.2 MG/DL (ref 8.6–10.2)
CERULOPLASMIN SERPL-MCNC: 26 MG/DL (ref 15–30)
CHLORIDE SERPL-SCNC: 101 MMOL/L (ref 98–107)
CHROMOGRANIN A: 86 NG/ML (ref 0–187)
CO2 SERPL-SCNC: 22 MMOL/L (ref 22–29)
CREAT SERPL-MCNC: 0.6 MG/DL (ref 0.7–1.2)
EOSINOPHIL # BLD: 0.12 K/UL (ref 0.05–0.5)
EOSINOPHILS RELATIVE PERCENT: 3 % (ref 0–6)
ERYTHROCYTE [DISTWIDTH] IN BLOOD BY AUTOMATED COUNT: 22.5 % (ref 11.5–15)
GFR SERPL CREATININE-BSD FRML MDRD: >90 ML/MIN/1.73M2
GLUCOSE BLD-MCNC: 106 MG/DL (ref 74–99)
GLUCOSE SERPL-MCNC: 111 MG/DL (ref 74–99)
HCT VFR BLD AUTO: 39.8 % (ref 37–54)
HGB BLD-MCNC: 11.5 G/DL (ref 12.5–16.5)
INR PPP: 1.1
LYMPHOCYTES NFR BLD: 1.07 K/UL (ref 1.5–4)
LYMPHOCYTES RELATIVE PERCENT: 23 % (ref 20–42)
MAGNESIUM SERPL-MCNC: 2.1 MG/DL (ref 1.6–2.6)
MCH RBC QN AUTO: 22.8 PG (ref 26–35)
MCHC RBC AUTO-ENTMCNC: 28.9 G/DL (ref 32–34.5)
MCV RBC AUTO: 78.8 FL (ref 80–99.9)
MONOCYTES NFR BLD: 0.33 K/UL (ref 0.1–0.95)
MONOCYTES NFR BLD: 7 % (ref 2–12)
NEUTROPHILS NFR BLD: 67 % (ref 43–80)
NEUTS SEG NFR BLD: 3.13 K/UL (ref 1.8–7.3)
PHOSPHATE SERPL-MCNC: 2.6 MG/DL (ref 2.5–4.5)
PLATELET # BLD AUTO: 156 K/UL (ref 130–450)
PMV BLD AUTO: 9.5 FL (ref 7–12)
POTASSIUM SERPL-SCNC: 3.9 MMOL/L (ref 3.5–5)
PROT SERPL-MCNC: 7 G/DL (ref 6.4–8.3)
PROTHROMBIN TIME: 12.5 SEC (ref 9.3–12.4)
RBC # BLD AUTO: 5.05 M/UL (ref 3.8–5.8)
RBC # BLD: ABNORMAL 10*6/UL
SODIUM SERPL-SCNC: 137 MMOL/L (ref 132–146)
SPECIMEN TYPE: NORMAL
SPECIMEN TYPE: NORMAL
WBC OTHER # BLD: 4.7 K/UL (ref 4.5–11.5)

## 2024-04-24 PROCEDURE — 83735 ASSAY OF MAGNESIUM: CPT

## 2024-04-24 PROCEDURE — 85610 PROTHROMBIN TIME: CPT

## 2024-04-24 PROCEDURE — 88360 TUMOR IMMUNOHISTOCHEM/MANUAL: CPT

## 2024-04-24 PROCEDURE — 99222 1ST HOSP IP/OBS MODERATE 55: CPT

## 2024-04-24 PROCEDURE — 2709999900 CT NEEDLE BIOPSY LIVER PERCUTANEOUS

## 2024-04-24 PROCEDURE — 2580000003 HC RX 258: Performed by: STUDENT IN AN ORGANIZED HEALTH CARE EDUCATION/TRAINING PROGRAM

## 2024-04-24 PROCEDURE — 2700000000 HC OXYGEN THERAPY PER DAY

## 2024-04-24 PROCEDURE — 82962 GLUCOSE BLOOD TEST: CPT

## 2024-04-24 PROCEDURE — 94664 DEMO&/EVAL PT USE INHALER: CPT

## 2024-04-24 PROCEDURE — 82248 BILIRUBIN DIRECT: CPT

## 2024-04-24 PROCEDURE — 88305 TISSUE EXAM BY PATHOLOGIST: CPT

## 2024-04-24 PROCEDURE — 80053 COMPREHEN METABOLIC PANEL: CPT

## 2024-04-24 PROCEDURE — 0DBW3ZX EXCISION OF PERITONEUM, PERCUTANEOUS APPROACH, DIAGNOSTIC: ICD-10-PCS | Performed by: STUDENT IN AN ORGANIZED HEALTH CARE EDUCATION/TRAINING PROGRAM

## 2024-04-24 PROCEDURE — 82042 OTHER SOURCE ALBUMIN QUAN EA: CPT

## 2024-04-24 PROCEDURE — 2500000003 HC RX 250 WO HCPCS: Performed by: RADIOLOGY

## 2024-04-24 PROCEDURE — A4216 STERILE WATER/SALINE, 10 ML: HCPCS | Performed by: STUDENT IN AN ORGANIZED HEALTH CARE EDUCATION/TRAINING PROGRAM

## 2024-04-24 PROCEDURE — 6360000002 HC RX W HCPCS: Performed by: STUDENT IN AN ORGANIZED HEALTH CARE EDUCATION/TRAINING PROGRAM

## 2024-04-24 PROCEDURE — 99232 SBSQ HOSP IP/OBS MODERATE 35: CPT | Performed by: STUDENT IN AN ORGANIZED HEALTH CARE EDUCATION/TRAINING PROGRAM

## 2024-04-24 PROCEDURE — 2060000000 HC ICU INTERMEDIATE R&B

## 2024-04-24 PROCEDURE — 88342 IMHCHEM/IMCYTCHM 1ST ANTB: CPT

## 2024-04-24 PROCEDURE — 99232 SBSQ HOSP IP/OBS MODERATE 35: CPT | Performed by: NURSE PRACTITIONER

## 2024-04-24 PROCEDURE — 85025 COMPLETE CBC W/AUTO DIFF WBC: CPT

## 2024-04-24 PROCEDURE — 87070 CULTURE OTHR SPECIMN AEROBIC: CPT

## 2024-04-24 PROCEDURE — 87205 SMEAR GRAM STAIN: CPT

## 2024-04-24 PROCEDURE — 88341 IMHCHEM/IMCYTCHM EA ADD ANTB: CPT

## 2024-04-24 PROCEDURE — 2500000003 HC RX 250 WO HCPCS: Performed by: PHYSICIAN ASSISTANT

## 2024-04-24 PROCEDURE — 2709999900 IR US GUIDED PARACENTESIS

## 2024-04-24 PROCEDURE — 88307 TISSUE EXAM BY PATHOLOGIST: CPT

## 2024-04-24 PROCEDURE — 84100 ASSAY OF PHOSPHORUS: CPT

## 2024-04-24 PROCEDURE — 6370000000 HC RX 637 (ALT 250 FOR IP): Performed by: STUDENT IN AN ORGANIZED HEALTH CARE EDUCATION/TRAINING PROGRAM

## 2024-04-24 PROCEDURE — 88112 CYTOPATH CELL ENHANCE TECH: CPT

## 2024-04-24 PROCEDURE — C9113 INJ PANTOPRAZOLE SODIUM, VIA: HCPCS | Performed by: STUDENT IN AN ORGANIZED HEALTH CARE EDUCATION/TRAINING PROGRAM

## 2024-04-24 PROCEDURE — 89051 BODY FLUID CELL COUNT: CPT

## 2024-04-24 PROCEDURE — 82150 ASSAY OF AMYLASE: CPT

## 2024-04-24 PROCEDURE — 0FB03ZX EXCISION OF LIVER, PERCUTANEOUS APPROACH, DIAGNOSTIC: ICD-10-PCS | Performed by: STUDENT IN AN ORGANIZED HEALTH CARE EDUCATION/TRAINING PROGRAM

## 2024-04-24 PROCEDURE — 6360000002 HC RX W HCPCS: Performed by: RADIOLOGY

## 2024-04-24 PROCEDURE — 47000 NEEDLE BIOPSY OF LIVER PERQ: CPT

## 2024-04-24 PROCEDURE — 36415 COLL VENOUS BLD VENIPUNCTURE: CPT

## 2024-04-24 PROCEDURE — 94640 AIRWAY INHALATION TREATMENT: CPT

## 2024-04-24 PROCEDURE — 0W9G3ZZ DRAINAGE OF PERITONEAL CAVITY, PERCUTANEOUS APPROACH: ICD-10-PCS | Performed by: STUDENT IN AN ORGANIZED HEALTH CARE EDUCATION/TRAINING PROGRAM

## 2024-04-24 RX ORDER — FENTANYL CITRATE 50 UG/ML
INJECTION, SOLUTION INTRAMUSCULAR; INTRAVENOUS PRN
Status: COMPLETED | OUTPATIENT
Start: 2024-04-24 | End: 2024-04-24

## 2024-04-24 RX ORDER — LIDOCAINE HYDROCHLORIDE 20 MG/ML
INJECTION, SOLUTION INFILTRATION; PERINEURAL PRN
Status: COMPLETED | OUTPATIENT
Start: 2024-04-24 | End: 2024-04-24

## 2024-04-24 RX ORDER — ALBUTEROL SULFATE 2.5 MG/3ML
2.5 SOLUTION RESPIRATORY (INHALATION)
Status: DISCONTINUED | OUTPATIENT
Start: 2024-04-24 | End: 2024-04-26 | Stop reason: HOSPADM

## 2024-04-24 RX ORDER — DIPHENHYDRAMINE HYDROCHLORIDE 50 MG/ML
INJECTION INTRAMUSCULAR; INTRAVENOUS PRN
Status: COMPLETED | OUTPATIENT
Start: 2024-04-24 | End: 2024-04-24

## 2024-04-24 RX ORDER — MIDAZOLAM HYDROCHLORIDE 2 MG/2ML
INJECTION, SOLUTION INTRAMUSCULAR; INTRAVENOUS PRN
Status: COMPLETED | OUTPATIENT
Start: 2024-04-24 | End: 2024-04-24

## 2024-04-24 RX ORDER — GUAIFENESIN 400 MG/1
400 TABLET ORAL 3 TIMES DAILY
Status: DISCONTINUED | OUTPATIENT
Start: 2024-04-24 | End: 2024-04-26 | Stop reason: HOSPADM

## 2024-04-24 RX ORDER — LIDOCAINE HYDROCHLORIDE AND EPINEPHRINE BITARTRATE 20; .01 MG/ML; MG/ML
INJECTION, SOLUTION SUBCUTANEOUS PRN
Status: COMPLETED | OUTPATIENT
Start: 2024-04-24 | End: 2024-04-24

## 2024-04-24 RX ADMIN — PANTOPRAZOLE SODIUM 40 MG: 40 INJECTION, POWDER, FOR SOLUTION INTRAVENOUS at 17:49

## 2024-04-24 RX ADMIN — FENTANYL CITRATE 25 MCG: 50 INJECTION, SOLUTION INTRAMUSCULAR; INTRAVENOUS at 15:46

## 2024-04-24 RX ADMIN — GUAIFENESIN 400 MG: 400 TABLET ORAL at 17:49

## 2024-04-24 RX ADMIN — POLYETHYLENE GLYCOL 3350 17 G: 17 POWDER, FOR SOLUTION ORAL at 21:29

## 2024-04-24 RX ADMIN — SODIUM CHLORIDE, PRESERVATIVE FREE 10 ML: 5 INJECTION INTRAVENOUS at 08:57

## 2024-04-24 RX ADMIN — ALBUTEROL SULFATE 2.5 MG: 2.5 SOLUTION RESPIRATORY (INHALATION) at 10:54

## 2024-04-24 RX ADMIN — MIDAZOLAM HYDROCHLORIDE 0.5 MG: 1 INJECTION, SOLUTION INTRAMUSCULAR; INTRAVENOUS at 15:50

## 2024-04-24 RX ADMIN — LIDOCAINE HYDROCHLORIDE 8 ML: 20 INJECTION, SOLUTION INFILTRATION; PERINEURAL at 15:55

## 2024-04-24 RX ADMIN — MIDAZOLAM HYDROCHLORIDE 0.5 MG: 1 INJECTION, SOLUTION INTRAMUSCULAR; INTRAVENOUS at 15:45

## 2024-04-24 RX ADMIN — DIPHENHYDRAMINE HYDROCHLORIDE 25 MG: 50 INJECTION, SOLUTION INTRAMUSCULAR; INTRAVENOUS at 15:43

## 2024-04-24 RX ADMIN — THROMBIN HUMAN 1 KIT: 2000 POWDER, FOR SOLUTION TOPICAL at 16:19

## 2024-04-24 RX ADMIN — PANTOPRAZOLE SODIUM 40 MG: 40 INJECTION, POWDER, FOR SOLUTION INTRAVENOUS at 05:40

## 2024-04-24 RX ADMIN — GUAIFENESIN 400 MG: 400 TABLET ORAL at 21:30

## 2024-04-24 RX ADMIN — LIDOCAINE HYDROCHLORIDE,EPINEPHRINE BITARTRATE 8 ML: 20; .01 INJECTION, SOLUTION INFILTRATION; PERINEURAL at 15:55

## 2024-04-24 RX ADMIN — SODIUM CHLORIDE, PRESERVATIVE FREE 10 ML: 5 INJECTION INTRAVENOUS at 21:30

## 2024-04-24 RX ADMIN — FENTANYL CITRATE 25 MCG: 50 INJECTION, SOLUTION INTRAMUSCULAR; INTRAVENOUS at 15:51

## 2024-04-24 RX ADMIN — ALBUTEROL SULFATE 2.5 MG: 2.5 SOLUTION RESPIRATORY (INHALATION) at 19:10

## 2024-04-24 RX ADMIN — SODIUM CHLORIDE, PRESERVATIVE FREE 10 ML: 5 INJECTION INTRAVENOUS at 05:41

## 2024-04-24 RX ADMIN — LIDOCAINE HYDROCHLORIDE 10 ML: 20 INJECTION, SOLUTION INFILTRATION; PERINEURAL at 14:22

## 2024-04-24 ASSESSMENT — PAIN SCALES - GENERAL
PAINLEVEL_OUTOF10: 0

## 2024-04-24 NOTE — PROGRESS NOTES
[facility] Hospitalist Progress Note    SYNOPSIS: Patient admitted on 2024 for GI bleed  68 y.o. year old male who has no past medical history on file. Who presented to ED over complaints of generalized fatigue and emesis. Patient states the complaint has been ongoing for the last month and worsened over the last few days and had dark coffee-ground emesis earlier yesterday evening. Per chart review, there was also concern for of patient having seizure-like activity per EMS. Hospital course thus far remarkable for patient with hypokalemia, lactic acidosis lactic acid 4.3, hemoglobin 6.1. CT of abdomen with significant ascites and concern for carcinomatosis, air in gallbladder, bilateral pleural effusions. General surgery was consulted who evaluated patient and recommended GI consult for possible variceal bleeding and new onset cirrhosis. Patient did receive packed red blood cells, Protonix, vancomycin and Zosyn due to possible pneumonia as well. Patient was admitted to medicine with consults placed for critical care and GI. Oncology also consulted. There is high concern for neoplastic process. IR consulted for liver biopsy and paracentesis. And thoracocentesis  -Patient had greenish emesis; NG inserted with drainage of 1 L of green liquid  -complains of dry cough   On breathing treatments and mucinex; incentive spirometry  Planned for thracocentesis; paracentesis and liver biopsy today; EGD planned for today    SUBJECTIVE:  Stable overnight. No other overnight issues reported.   Patient seen and examined  Records reviewed.           Temp (24hrs), Av.6 °F (36.4 °C), Min:96.8 °F (36 °C), Max:98.7 °F (37.1 °C)    DIET: Diet NPO Exceptions are: Sips of Water with Meds  CODE: Full Code    Intake/Output Summary (Last 24 hours) at 2024 1023  Last data filed at 2024 1400  Gross per 24 hour   Intake --   Output 300 ml   Net -300 ml       Review of Systems  All bolded are positive; please

## 2024-04-24 NOTE — PRE SEDATION
Sedation Pre-Procedure Note    Patient Name: Marcello Escudero   YOB: 1955  Room/Bed: 7415/7415-A  Medical Record Number: [id number]  Date: 4/24/2024   Time: 4:24 PM       Indication:  neoplasm    Consent: I have discussed with the patient and/or the patient representative the indication, alternatives, and the possible risks and/or complications of the planned procedure and the anesthesia methods. The patient and/or patient representative appear to understand and agree to proceed.    Vital Signs:   Vitals:    04/24/24 1620   BP: 124/69   Pulse: 99   Resp: 17   Temp:    SpO2: 96%       Past Medical History:   has no past medical history on file.    Past Surgical History:   has a past surgical history that includes eye surgery and Paracentesis (Left, 04/24/2024).    Medications:   Scheduled Meds:    albuterol  2.5 mg Nebulization 4x Daily RT    guaiFENesin  400 mg Oral TID    [Held by provider] enoxaparin  40 mg SubCUTAneous Daily    polyethylene glycol  17 g Oral BID    folic acid  1 mg Oral Daily    sodium chloride flush  5-40 mL IntraVENous 2 times per day    pantoprazole (PROTONIX) 40 mg in sodium chloride (PF) 0.9 % 10 mL injection  40 mg IntraVENous Q12H     Continuous Infusions:    sodium chloride       PRN Meds: diphenhydrAMINE, midazolam, fentanNYL, lidocaine-EPINEPHrine, lidocaine, Surgiflo with Evithrom Hemostatic Matrix, polyethylene glycol, sodium chloride flush, sodium chloride, ondansetron **OR** ondansetron, acetaminophen **OR** acetaminophen  Home Meds:   Prior to Admission medications    Not on File     Coumadin Use Last 7 Days:  no  Antiplatelet drug therapy use last 7 days: no  Other anticoagulant use last 7 days: no  Additional Medication Information:  N/A      Pre-Sedation Documentation and Exam:   I have reviewed the patient's history and review of systems.    Mallampati Airway Assessment:  Mallampati Class II - (soft palate, fauces & uvula are visible)    Prior History of Anesthesia  Complications:   none    ASA Classification:  Class 3 - A patient with severe systemic disease that limits activity but is not incapacitating    Sedation/ Anesthesia Plan:   intravenous sedation    Medications Planned:   fentanyl intravenously    Patient is an appropriate candidate for plan of sedation: yes    Electronically signed by JERICA Grover MD on 4/24/2024 at 4:24 PM

## 2024-04-24 NOTE — BRIEF OP NOTE
Brief-Op Note  ______________________________________________________________      IR U/S GUIDED PARACENTESIS  [facility]    Patient Name: Marcello Escudero   YOB: 1955  Medical Record Number: [id number]  Date of Procedure: 4/24/24  Room/Bed: [address] [place] [address]      Pre-operative Diagnosis: Ascites    Post-operative Diagnosis: Ascites    Consent: Informed consent was obtained from the patient prior to the procedure. The details of the procedure, as well is its risks, benefits, and alternatives, were explained.      Anesthesia: Local anesthesia.    Performed by: RODOLFO Pugh under on-site supervision by Get Grover MD.    Estimated blood loss: Minimal    Complications: None    Specimen Obtained: 4400mL of serous colored ascites fluid was withdrawn.    (See radiology dictation in PACs for image review and additional procedural information)    Electronically signed by RODOLFO Pugh   DD: 4/24/24  4:02 PM

## 2024-04-24 NOTE — INTERVAL H&P NOTE
IR H&P UPDATE NOTE  [facility]    Patient's History and Physical Examination were reviewed from current hospital admission records.    Marcello Escudero appears likely to able to tolerate the requested Paracentesis procedure by the consultant.    Risk and benefits were discussed with patient including ultimate complications, possibly death and consent was obtained.    Electronically signed by RODOLFO Pugh   DD: 4/24/24  4:01 PM

## 2024-04-24 NOTE — PROGRESS NOTES
4 Eyes Skin Assessment     NAME:  Marcello Escudero  YOB: 1955  MEDICAL RECORD NUMBER:  [id number]    The patient is being assessed for  Transfer to New Unit    I agree that at least one RN has performed a thorough Head to Toe Skin Assessment on the patient. ALL assessment sites listed below have been assessed.      Areas assessed by both nurses:    Head, Face, Ears, Shoulders, Back, Chest, Arms, Elbows, Hands, Sacrum. Buttock, Coccyx, Ischium, and Legs. Feet and Heels        Does the Patient have a Wound? No noted wound(s)       Merrill Prevention initiated by RN: Yes  Wound Care Orders initiated by RN: No    Pressure Injury (Stage 3,4, Unstageable, DTI, NWPT, and Complex wounds) if present, place Wound referral order by RN under : No    New Ostomies, if present place, Ostomy referral order under : No     Nurse 1 eSignature: Electronically signed by Carmencita Durham RN on 4/24/24 at 3:23 AM EDT    **SHARE this note so that the co-signing nurse can place an eSignature**    Nurse 2 eSignature: {Esignature:[id number]}

## 2024-04-24 NOTE — PROGRESS NOTES
Attempted to call floor to speak with RN regarding patient's ordered liver bx and para. Please keep patient NPO, and hold all thinners for procedure.  According to documentation patient is able to sign consent.

## 2024-04-24 NOTE — PROGRESS NOTES
Patient returned from procedure. Dressing checked, clean, dry, and intact. Patient stable. No s/s of complications noted or reported. Vitals will be checked q 15min, see flow sheets.  Report called to floor and given to RN. Pt placed in transport.

## 2024-04-24 NOTE — OP NOTE
Operative Note  ______________________________________________________________      IR U/S GUIDED PARACENTESIS  [facility]    Patient Name: Marcello Escudero   YOB: 1955  Medical Record Number: [id number]  Date of Procedure: 4/24/24  Room/Bed: [address] [place] [address]    Pre-operative Diagnosis: Ascites    Post-operative Diagnosis: Ascites    Consent: Informed consent was obtained from the patient prior to the procedure. The details of the procedure, as well is its risks, benefits, and alternatives, were explained.      Anesthesia: Local anesthesia with approximately 10mL of 2% Lidocaine without epinephrine administered subcutaneously.    Performed by: RODOLFO Pugh under on-site supervision by Get Grover MD.    Estimated blood loss: Minimal    Complications: None    Specimens Obtained: Ascites Fluid    Procedure: Routine scanning of all four abdominal quadrants was performed using real-time ultrasound and revealed sufficient amount of ascites fluid present.  Decision was made to proceed with procedure.  After obtaining consent, a \"Time-Out\" was called to verify the correct patient, procedure/location, allergies, relevant medications held for procedure and that all equipment is functioning and available. The patient was then placed in the supine position with the head of the bed slightly elevated and the appropriate landmarks were identified. The skin over the puncture site in the left lower quadrant region was prepped with betadine and draped in a sterile fashion. Local anesthesia was obtained by infiltration using 2% Lidocaine without epinephrine. A 5 Eritrean needle sheath catheter was then advanced into the abdominal cavity. Fluid return was serous colored.      A total volume of  4400mL was withdrawn. The catheter was then withdrawn and a sterile dressing was placed over the site. The patient tolerated the procedure well.     Complications: None.     Estimated Blood Loss: < 10cc.     End of procedure note..

## 2024-04-24 NOTE — PLAN OF CARE
Problem: Discharge Planning  Goal: Discharge to home or other facility with appropriate resources  Outcome: Progressing  Flowsheets (Taken 4/24/2024 0845)  Discharge to home or other facility with appropriate resources: Identify barriers to discharge with patient and caregiver     Problem: Safety - Adult  Goal: Free from fall injury  Outcome: Progressing     Problem: Pain  Goal: Verbalizes/displays adequate comfort level or baseline comfort level  Outcome: Progressing  Flowsheets  Taken 4/24/2024 1630 by Senait Garcia, RN  Verbalizes/displays adequate comfort level or baseline comfort level: Encourage patient to monitor pain and request assistance  Taken 4/24/2024 0845 by Senait Garcia, RN  Verbalizes/displays adequate comfort level or baseline comfort level: Encourage patient to monitor pain and request assistance  Taken 4/24/2024 0530 by Carmencita Durham, RN  Verbalizes/displays adequate comfort level or baseline comfort level: Encourage patient to monitor pain and request assistance     Problem: Nutrition Deficit:  Goal: Optimize nutritional status  Outcome: Progressing  Flowsheets (Taken 4/24/2024 1826)  Nutrient intake appropriate for improving, restoring, or maintaining nutritional needs: Assess nutritional status and recommend course of action

## 2024-04-24 NOTE — BRIEF OP NOTE
Brief Postoperative Note      Patient: Marcello Escudero  YOB: 1955  MRN: [id number]    Date of Procedure: 4/24/2024    PERITONEAL CARCINOMATOSIS    Post-Op Diagnosis: Same       CT biopsy  KASSY Grover    Assistant:  Physician Assistant: Himanshu Asher PA    Anesthesia: * Moderate sedation    Estimated Blood Loss (mL): Minimal    Complications: None    Specimens:   Liver and peritoneal mass    Implants:  * No implants in log *      Drains:   NG/OG/NJ/NE Tube Nasogastric 18 fr Right nostril (Active)   Status Suction-low intermittent 04/24/24 0845   Placement Verified X-Ray (Initial) 04/24/24 0845   NG/OG/NJ/NE External Measurement (cm) 55 cm 04/24/24 0845   Output (mL) 600 ml 04/23/24 0737       [REMOVED] NG/OG/NJ/NE Tube 16 fr Left nostril (Removed)   Output (mL) 200 ml 04/20/24 1600       Findings:  Infection Present At Time Of Surgery (PATOS) (choose all levels that have infection present):    Other Findings: Core biopsy of carcinomatosis and liver lesion    Electronically signed by JERICA Grover MD on 4/24/2024 at 4:28 PM

## 2024-04-24 NOTE — CARE COORDINATION
Received case in transfer, pt for a liver bx and paracentesis. Pt has corepak. He would like to complete POA papers, spiritual care has seen pt on 4/22, pt wanted brother present. Called spiritual care today, they came to meet with pt and pt again would like brother present. GI on board, possible EGD 4/25-4/26. PT 16/24 corepak is limiting his ambulation. Will follow for discharge needs.Marita Samuel, MSW, LSW

## 2024-04-24 NOTE — PROGRESS NOTES
[facility]   Gastroenterology, Hepatology, &  Advanced Endoscopy      EGD tomorrow 4/25/24, may need to be pushed to Friday but will attempt to get done tomorrow.  -NPO tonight      Reason for consult: UGI bleed/Coffee ground emesis.  ASSESSMENT AND PLAN:  -Ascites and suspicious for peritoneal nodularity concerning for carcinomatosis.  -Multiple indistinct hepatic lesions concerning for metastases.    Negative Hepatitis panel  -Hgb 10.3  -CEA 2.5  -INR 1.3  -Protonix 40 mg q 12 hours.  - LFT's essentially unremarkable except TB 1.7  - Completion of Hepatic serology  -Albumin 2.2, 75 g SPA to be given prior to paracentesis  - Chromogranin -A, AFP, CA 19.9 pending  - Paracentesis ordered (to be done today) w fluids for analysis, added CEA ascites fluid. Will await serology results.  - Monitor for further episodes of GI bleed  - Monitor Hgb transfuse to keep >7  10.3  - Await liver biopsy to be done today.  - No EGD in the past.  - EGD tomorrow  - NPO after midnight tonight      HISTORY OF PRESENT ILLNESS:    Mr. Marcello Escudero, a 68 y.o. year old male presented to ED with complaints of generalized fatigue and emesis. Patient states the complaint has been ongoing for the last month and worsened over the last few days and had dark coffee-ground emesis yesterday evening. Denies EGD in the past.     4/19/24 CT A/P w IV contrast  FINDINGS:  Lower Chest:  Evaluated on separate exam.     Organs:   There multiple indistinct low-attenuation hepatic lesions for  example image 40 measuring 1.6 cm.  Gallbladder not well distended.  Adjacent  strandy density noted and internal air.  Couple of air foci along the margin such as axial image 74 suspicious for being extrinsic, also medially cephalad to the colon..  Possible hepatic cirrhosis.     GI/Bowel: There are mid to upper dilated small bowel loops with a suggestion  of wall thickening that may represent edema or inflammation.  These findings  are amidst prominent ascites.   reports that he has quit smoking. His smoking use included cigarettes. He has a 20.0 pack-year smoking history. He has never used smokeless tobacco.  ETOH:   reports no history of alcohol use.  DRUGS:   reports no history of drug use.  Family History:       Problem Relation Age of Onset    Diabetes Father          Current Facility-Administered Medications:     albuterol (PROVENTIL) (2.5 MG/3ML) 0.083% nebulizer solution 2.5 mg, 2.5 mg, Nebulization, 4x Daily RT, Bea Sharpe MD, 2.5 mg at 04/24/24 1054    guaiFENesin tablet 400 mg, 400 mg, Oral, TID, Bea Sharpe MD    [Held by provider] enoxaparin (LOVENOX) injection 40 mg, 40 mg, SubCUTAneous, Daily, VanesaKassidy MD, 40 mg at 04/23/24 1526    polyethylene glycol (GoLYTELY) solution 2,000 mL, 2,000 mL, Oral, PRN, Hayden Carvalho MD    polyethylene glycol (GLYCOLAX) packet 17 g, 17 g, Oral, BID, Jes Mayfield MD, 17 g at 04/23/24 2137    folic acid (FOLVITE) tablet 1 mg, 1 mg, Oral, Daily, Jes Mayfield MD, 1 mg at 04/22/24 0832    sodium chloride flush 0.9 % injection 5-40 mL, 5-40 mL, IntraVENous, 2 times per day, Jes Mayfield MD, 10 mL at 04/24/24 0857    sodium chloride flush 0.9 % injection 5-40 mL, 5-40 mL, IntraVENous, PRN, Jes Mayfield MD, 10 mL at 04/24/24 0541    0.9 % sodium chloride infusion, , IntraVENous, PRN, Jes Mayfield MD    ondansetron (ZOFRAN-ODT) disintegrating tablet 4 mg, 4 mg, Oral, Q8H PRN **OR** ondansetron (ZOFRAN) injection 4 mg, 4 mg, IntraVENous, Q6H PRN, Jes Mayfield MD, 4 mg at 04/21/24 1421    acetaminophen (TYLENOL) tablet 650 mg, 650 mg, Oral, Q6H PRN **OR** acetaminophen (TYLENOL) suppository 650 mg, 650 mg, Rectal, Q6H PRN, Jes Mayfield MD    pantoprazole (PROTONIX) 40 mg in sodium chloride (PF) 0.9 % 10 mL injection, 40 mg, IntraVENous, Q12H, Jes Mayfield MD, 40 mg at 04/24/24 0540     Allergies:  Patient has no known allergies.    ROS:  Aside from what was mentioned in the past medical

## 2024-04-24 NOTE — CONSULTS
Palliative Care Department  [phone]  Palliative Care Initial Consult  Provider Breanna Novak, JANNY - CNP      PATIENT: Marcello Escudero  : 1955  MRN: [id number]  ADMISSION DATE: 2024  8:14 PM  Referring Provider:  Bea Sharpe MD    Palliative Medicine was consulted on hospital day 4 for assistance with Goals of care    HPI:     Clinical Summary:Marcello Escudero is a 68 y.o. y/o male with no significant medical history on record who presented to [facility] on 2024 with generalized weakness, fatigue, and emesis, admitted with GI bleed.  CTAP shows significant ascites, and concern for carcinomatosis, air in gallbladder, bilateral pleural effusion.  General surgery recommended GI consult for possible variceal bleeding and new onset cirrhosis.  Plan is for liver biopsy and paracentesis today, EGD scheduled for Friday.  Oncology, and podiatry following.  Palliative medicine consulted to assist with goals of care  ASSESSMENT/PLAN:     Pertinent Hospital Diagnoses     Acute blood loss anemia  GI bleed  Concern for carcinomatosis  Hepatic lesions  Cirrhosis with ascites  Soft tissue mass in the right abdomen abutting the duodenum and second suspicious for malignancy  Hypokalemia      Palliative Care Encounter / Counseling Regarding Goals of Care  Please see detailed goals of care discussion as below  At this time, Marcello Escudero, Does have capacity for medical decision-making.  Capacity is time limited and situation/question specific  Outcome of goals of care meeting:  Continue with current medical treatment  Continue full code  Will like to pursue treatment  Goal is to return home  Once to complete HCPOA appointing brother Zia and mazin Warren as his POA  Code status Full Code  Advanced Directives: Wants to complete advance directive appointing brother Zia as POA  Surrogate/Legal NOK:  Zia Escudero () [phone]    Spiritual assessment: no spiritual distress

## 2024-04-25 PROBLEM — E43 SEVERE PROTEIN-CALORIE MALNUTRITION (HCC): Status: ACTIVE | Noted: 2024-04-25

## 2024-04-25 LAB
ALBUMIN SERPL-MCNC: 2.2 G/DL (ref 3.5–5.2)
ALP SERPL-CCNC: 81 U/L (ref 40–129)
ALT SERPL-CCNC: 7 U/L (ref 0–40)
ANION GAP SERPL CALCULATED.3IONS-SCNC: 14 MMOL/L (ref 7–16)
APPEARANCE FLD: NORMAL
AST SERPL-CCNC: 25 U/L (ref 0–39)
ATYPICAL LYMPHOCYTE ABSOLUTE COUNT: 0.17 K/UL (ref 0–0.46)
ATYPICAL LYMPHOCYTES: 4 % (ref 0–4)
BASOPHILS # BLD: 0 K/UL (ref 0–0.2)
BASOPHILS NFR BLD: 0 % (ref 0–2)
BILIRUB DIRECT SERPL-MCNC: 1.3 MG/DL (ref 0–0.3)
BILIRUB INDIRECT SERPL-MCNC: 0.5 MG/DL (ref 0–1)
BILIRUB SERPL-MCNC: 1.8 MG/DL (ref 0–1.2)
BODY FLD TYPE: NORMAL
BUN SERPL-MCNC: 11 MG/DL (ref 6–23)
CALCIUM SERPL-MCNC: 8 MG/DL (ref 8.6–10.2)
CHLORIDE SERPL-SCNC: 102 MMOL/L (ref 98–107)
CLOT CHECK: NORMAL
CO2 SERPL-SCNC: 21 MMOL/L (ref 22–29)
COLOR FLD: YELLOW
CREAT SERPL-MCNC: 0.6 MG/DL (ref 0.7–1.2)
EOSINOPHIL # BLD: 0 K/UL (ref 0.05–0.5)
EOSINOPHILS RELATIVE PERCENT: 0 % (ref 0–6)
ERYTHROCYTE [DISTWIDTH] IN BLOOD BY AUTOMATED COUNT: 22.8 % (ref 11.5–15)
GFR SERPL CREATININE-BSD FRML MDRD: >90 ML/MIN/1.73M2
GLUCOSE BLD-MCNC: 156 MG/DL (ref 74–99)
GLUCOSE SERPL-MCNC: 110 MG/DL (ref 74–99)
HCT VFR BLD AUTO: 40.6 % (ref 37–54)
HGB BLD-MCNC: 11.8 G/DL (ref 12.5–16.5)
LYMPHOCYTES NFR BLD: 0.52 K/UL (ref 1.5–4)
LYMPHOCYTES RELATIVE PERCENT: 10 % (ref 20–42)
MAGNESIUM SERPL-MCNC: 1.9 MG/DL (ref 1.6–2.6)
MANUAL DIF COMMENT FLD-IMP: NORMAL
MCH RBC QN AUTO: 22.6 PG (ref 26–35)
MCHC RBC AUTO-ENTMCNC: 29.1 G/DL (ref 32–34.5)
MCV RBC AUTO: 77.9 FL (ref 80–99.9)
METAMYELOCYTES ABSOLUTE COUNT: 0.04 K/UL (ref 0–0.12)
METAMYELOCYTES: 1 % (ref 0–1)
MICROORGANISM SPEC CULT: NORMAL
MICROORGANISM SPEC CULT: NORMAL
MONOCYTES NFR BLD: 0.7 K/UL (ref 0.1–0.95)
MONOCYTES NFR BLD: 14 % (ref 2–12)
MONOCYTES NFR FLD: 80 %
NEUTROPHILS NFR BLD: 71 % (ref 43–80)
NEUTROPHILS NFR FLD: 20 %
NEUTS SEG NFR BLD: 3.57 K/UL (ref 1.8–7.3)
NUCLEATED RED BLOOD CELLS: 1 PER 100 WBC
PHOSPHATE SERPL-MCNC: 3.1 MG/DL (ref 2.5–4.5)
PLATELET # BLD AUTO: 149 K/UL (ref 130–450)
PMV BLD AUTO: 9.9 FL (ref 7–12)
POTASSIUM SERPL-SCNC: 4.2 MMOL/L (ref 3.5–5)
PROT SERPL-MCNC: 6.2 G/DL (ref 6.4–8.3)
RBC # BLD AUTO: 5.21 M/UL (ref 3.8–5.8)
RBC # BLD: ABNORMAL 10*6/UL
RBC # FLD: NORMAL CELLS/UL
SERVICE CMNT-IMP: NORMAL
SERVICE CMNT-IMP: NORMAL
SODIUM SERPL-SCNC: 137 MMOL/L (ref 132–146)
SPECIMEN DESCRIPTION: NORMAL
SPECIMEN DESCRIPTION: NORMAL
WBC # FLD: 399 CELLS/UL
WBC OTHER # BLD: 5 K/UL (ref 4.5–11.5)

## 2024-04-25 PROCEDURE — 83735 ASSAY OF MAGNESIUM: CPT

## 2024-04-25 PROCEDURE — 80053 COMPREHEN METABOLIC PANEL: CPT

## 2024-04-25 PROCEDURE — 99232 SBSQ HOSP IP/OBS MODERATE 35: CPT | Performed by: STUDENT IN AN ORGANIZED HEALTH CARE EDUCATION/TRAINING PROGRAM

## 2024-04-25 PROCEDURE — 82962 GLUCOSE BLOOD TEST: CPT

## 2024-04-25 PROCEDURE — 6360000002 HC RX W HCPCS: Performed by: STUDENT IN AN ORGANIZED HEALTH CARE EDUCATION/TRAINING PROGRAM

## 2024-04-25 PROCEDURE — 2700000000 HC OXYGEN THERAPY PER DAY

## 2024-04-25 PROCEDURE — 85025 COMPLETE CBC W/AUTO DIFF WBC: CPT

## 2024-04-25 PROCEDURE — 2060000000 HC ICU INTERMEDIATE R&B

## 2024-04-25 PROCEDURE — C9113 INJ PANTOPRAZOLE SODIUM, VIA: HCPCS | Performed by: STUDENT IN AN ORGANIZED HEALTH CARE EDUCATION/TRAINING PROGRAM

## 2024-04-25 PROCEDURE — 82248 BILIRUBIN DIRECT: CPT

## 2024-04-25 PROCEDURE — 97530 THERAPEUTIC ACTIVITIES: CPT

## 2024-04-25 PROCEDURE — 94640 AIRWAY INHALATION TREATMENT: CPT

## 2024-04-25 PROCEDURE — 6370000000 HC RX 637 (ALT 250 FOR IP): Performed by: STUDENT IN AN ORGANIZED HEALTH CARE EDUCATION/TRAINING PROGRAM

## 2024-04-25 PROCEDURE — 84100 ASSAY OF PHOSPHORUS: CPT

## 2024-04-25 PROCEDURE — 97535 SELF CARE MNGMENT TRAINING: CPT

## 2024-04-25 PROCEDURE — 99232 SBSQ HOSP IP/OBS MODERATE 35: CPT | Performed by: NURSE PRACTITIONER

## 2024-04-25 PROCEDURE — 2580000003 HC RX 258: Performed by: STUDENT IN AN ORGANIZED HEALTH CARE EDUCATION/TRAINING PROGRAM

## 2024-04-25 PROCEDURE — A4216 STERILE WATER/SALINE, 10 ML: HCPCS | Performed by: STUDENT IN AN ORGANIZED HEALTH CARE EDUCATION/TRAINING PROGRAM

## 2024-04-25 PROCEDURE — 36415 COLL VENOUS BLD VENIPUNCTURE: CPT

## 2024-04-25 RX ADMIN — PANTOPRAZOLE SODIUM 40 MG: 40 INJECTION, POWDER, FOR SOLUTION INTRAVENOUS at 17:08

## 2024-04-25 RX ADMIN — GUAIFENESIN 400 MG: 400 TABLET ORAL at 08:36

## 2024-04-25 RX ADMIN — SODIUM CHLORIDE, PRESERVATIVE FREE 10 ML: 5 INJECTION INTRAVENOUS at 08:43

## 2024-04-25 RX ADMIN — ALBUTEROL SULFATE 2.5 MG: 2.5 SOLUTION RESPIRATORY (INHALATION) at 19:13

## 2024-04-25 RX ADMIN — GUAIFENESIN 400 MG: 400 TABLET ORAL at 14:59

## 2024-04-25 RX ADMIN — ALBUTEROL SULFATE 2.5 MG: 2.5 SOLUTION RESPIRATORY (INHALATION) at 08:15

## 2024-04-25 RX ADMIN — FOLIC ACID 1 MG: 1 TABLET ORAL at 08:36

## 2024-04-25 RX ADMIN — ALBUTEROL SULFATE 2.5 MG: 2.5 SOLUTION RESPIRATORY (INHALATION) at 15:44

## 2024-04-25 RX ADMIN — PANTOPRAZOLE SODIUM 40 MG: 40 INJECTION, POWDER, FOR SOLUTION INTRAVENOUS at 06:40

## 2024-04-25 RX ADMIN — ALBUTEROL SULFATE 2.5 MG: 2.5 SOLUTION RESPIRATORY (INHALATION) at 12:14

## 2024-04-25 RX ADMIN — POLYETHYLENE GLYCOL 3350 17 G: 17 POWDER, FOR SOLUTION ORAL at 11:34

## 2024-04-25 ASSESSMENT — PAIN SCALES - GENERAL: PAINLEVEL_OUTOF10: 0

## 2024-04-25 NOTE — PLAN OF CARE
Problem: Discharge Planning  Goal: Discharge to home or other facility with appropriate resources  4/25/2024 0844 by Lucille Bustamante RN  Outcome: Progressing  4/25/2024 0203 by Carmencita Durham RN  Outcome: Progressing  Flowsheets (Taken 4/24/2024 2015)  Discharge to home or other facility with appropriate resources: Identify barriers to discharge with patient and caregiver     Problem: Safety - Adult  Goal: Free from fall injury  4/25/2024 0844 by Lucille Bustamante RN  Outcome: Progressing  4/25/2024 0203 by Carmencita Durham RN  Outcome: Progressing     Problem: Pain  Goal: Verbalizes/displays adequate comfort level or baseline comfort level  4/25/2024 0844 by Lucille Bustamante RN  Outcome: Progressing  Flowsheets (Taken 4/25/2024 0415 by Carmencita Durham, RN)  Verbalizes/displays adequate comfort level or baseline comfort level: Encourage patient to monitor pain and request assistance  4/25/2024 0203 by Carmencita Durham, RN  Outcome: Progressing  Flowsheets  Taken 4/25/2024 0020  Verbalizes/displays adequate comfort level or baseline comfort level: Encourage patient to monitor pain and request assistance  Taken 4/24/2024 2015  Verbalizes/displays adequate comfort level or baseline comfort level: Encourage patient to monitor pain and request assistance     Problem: Nutrition Deficit:  Goal: Optimize nutritional status  4/25/2024 0203 by Carmencita Durham, RN  Outcome: Progressing

## 2024-04-25 NOTE — PROGRESS NOTES
[facility]  Hematology/Oncology  Consult  Dr. Matt Bustamante      Patient Name: Marcello Escudero  YOB: 1955  PCP: No primary care provider on file.   Referring Provider:      Reason for Consultation:   Chief Complaint   Patient presents with    Fatigue    Emesis    Seizures     Weakness for sometime, vomiting bright red blood, seizure per EMS lasting about 5-7 minutes. Now A&O x4        History of Present Illness:  This pt is a 67 yo male who initially presented to the ER with weakness, fatigue and bright red emesis. He endorses weight loss and a ~1 month feeling of malaise. CBC with normal WBC and platelets, however Hgb was 6.1, MCV 74. Iron profile with ferritin 1785, low serum iron and low TIBC. B12 normal, folate low. Retic % was 4.0. CMP with mild DONY, now improved. Lactic acid 4.3, down trended to 1.9. LFTs with T bili down to 1.5 (elevation was from direct bilirubin). CTA with moderate R and moderate to large L pleural effusion, no PE, patchy infiltrates. CT A/P with ascites and suspicion for peritoneal nodularity concerning for carcinomatosis, multiple indistinct hepatic lesions, soft tissue mass abutting duodenum and cecum. Repeat CT A/P yesterday showing multiple dilated SB up to 4.5 cm, increased compared to prior. CBC today with normal WBC and platelets, Hgb improved to 10.4, s/p 2 units pRBCs, MCV 76. Oncology consulted for further evaluation     Diagnostic Data:     No past medical history on file.    Patient Active Problem List    Diagnosis Date Noted    Other cirrhosis of liver (HCC) 04/24/2024    Liver lesion 04/24/2024    Other ascites 04/24/2024    GI bleed 04/20/2024    Abdominal carcinomatosis (HCC) 04/20/2024    Metastases to the liver (HCC) 04/20/2024    Anemia, unspecified 04/19/2024        Past Surgical History:   Procedure Laterality Date    CT NEEDLE BIOPSY LIVER PERCUTANEOUS  4/24/2024    CT NEEDLE BIOPSY LIVER PERCUTANEOUS 4/24/2024 JERICA Grover MD [facility] CT     embolus identified         XR CHEST PORTABLE   Final Result   1. Bilateral lower lobe airspace disease/pneumonia   2. Possible left pleural effusion.   3. CT of the chest is recommended.               ASSESSMENT/PLAN :  67 yo male  UGI bleeding  Microcytic anemia  Peritoneal carcinomatosis with likely liver metastasis and soft tissue mass in lower R abdomen    - CTA chest and CT A/P reviewed as above. High concern for neoplastic process  - Check tumor markers  - Seen by surgery, IR liver biopsy and paracentesis ordered. Agree  - Anemia is microcytic. Iron profile more consistent with AOCD, however he may benefit from IV as outpt pending Hgb trend  - Folate 1 mg daily ordered as well  - Initial coagulopathy improved, PT 15.3, INR 1.4   - Will follow    4/25/24  - Peritoneal carcinomatosis with likely liver metastasis and soft tissue mass in lower R abdomen. CTA chest and CT A/P reviewed as above. High concern for neoplastic process  - Tumor markers including chromogranin A, AFP, CEA and Ca 19-9 were all WNL  - Seen by surgery, S/p IR liver biopsy and paracentesis. Pathology pending.   - Anemia is microcytic. Iron profile more consistent with AOCD, however he may benefit from IV as outpt pending Hgb trend  - Folate 1 mg daily  - Will follow      Electronically signed by JANNY Agee - CNP on 4/25/2024 at 11:34 AM

## 2024-04-25 NOTE — PLAN OF CARE
Problem: Discharge Planning  Goal: Discharge to home or other facility with appropriate resources  4/25/2024 0203 by Carmencita Durham, RN  Outcome: Progressing  Flowsheets (Taken 4/24/2024 2015)  Discharge to home or other facility with appropriate resources: Identify barriers to discharge with patient and caregiver  4/24/2024 1826 by Senait Garcia RN  Outcome: Progressing  Flowsheets (Taken 4/24/2024 0845)  Discharge to home or other facility with appropriate resources: Identify barriers to discharge with patient and caregiver     Problem: Safety - Adult  Goal: Free from fall injury  4/25/2024 0203 by Carmencita Durham, RN  Outcome: Progressing  4/24/2024 1826 by Senait Garcia RN  Outcome: Progressing     Problem: Pain  Goal: Verbalizes/displays adequate comfort level or baseline comfort level  4/25/2024 0203 by Carmencita Durhma, RN  Outcome: Progressing  Flowsheets (Taken 4/24/2024 2015)  Verbalizes/displays adequate comfort level or baseline comfort level: Encourage patient to monitor pain and request assistance  4/24/2024 1826 by Senait Garcia, RN  Outcome: Progressing  Flowsheets  Taken 4/24/2024 1630 by Senait Garcia, APRYL  Verbalizes/displays adequate comfort level or baseline comfort level: Encourage patient to monitor pain and request assistance  Taken 4/24/2024 0845 by Senait Garcia RN  Verbalizes/displays adequate comfort level or baseline comfort level: Encourage patient to monitor pain and request assistance  Taken 4/24/2024 0530 by Carmencita Durham, RN  Verbalizes/displays adequate comfort level or baseline comfort level: Encourage patient to monitor pain and request assistance     Problem: Nutrition Deficit:  Goal: Optimize nutritional status  4/25/2024 0203 by Carmencita Durham, RN  Outcome: Progressing  4/24/2024 1826 by Senait Garcia RN  Outcome: Progressing  Flowsheets (Taken 4/24/2024 1826)  Nutrient intake appropriate for improving, restoring, or

## 2024-04-25 NOTE — PROGRESS NOTES
visited patient as a palliative care consult.  Upon my entrance into the room Mr. Escudero was lying in bed and alert.  I asked him about his health struggles and what brought him to the hospital and he stated that he fell because of weakness.  When I asked what the medical team had been telling him he seemed not to fully know.  We talked about his support system and he told me that he has three siblings.  His brother lives next door to him while another brother lives locally and a sister lives in [facility].  We talked about how he used to work at a local [employer] until he retired.  I asked him about his spiritual and Shinto background and he said that he is not Shinto or spiritual.  I provided him with a prayer card to reflect on and encouraged him to reach out to Spiritual Health if he would like additional support in the future.     If additional support is requested or needed please reach out to Spiritual Health ([phone]).    Kaycee Solorio MDIV, [facility]       04/25/24 1022   Encounter Summary   Encounter Overview/Reason Spiritual/Emotional Needs;Palliative Care   Service Provided For Patient   Referral/Consult From Rounding;Palliative Care   Support System Family members;Parent   Last Encounter  04/25/24  (p-DS)   Complexity of Encounter Moderate   Spiritual/Emotional needs   Type Spiritual Support   Palliative Care   Type Palliative Care, Initial/Spiritual Assessment   Assessment/Intervention/Outcome   Assessment Calm   Intervention Active listening;Discussed relationship with God;Discussed illness injury and it’s impact;Nurtured Hope;Discussed belief system/Shinto practices/lotus;Prayer (assurance of)/[place]   Outcome Comfort;Expressed Gratitude;Engaged in conversation;Receptive   Plan and Referrals   Plan/Referrals Provided reading/devotional materials

## 2024-04-25 NOTE — CARE COORDINATION
Transition of care update. Patient was for an EGD today, and is now scheduled for tomorrow.  His diet was advanced to clears, and he was tolerating well per IDR today.  Per attending, if diet continues to advance, will remove [place]. Therapy working with patient to assist with d/c planning. Await notes, but therapy relayed he is a stand by assist and walker recommended. Order placed in [facility], and call to Abigail at [facility]. Patient stated that his d/c plan is home with his brother Jose De Jesus forde. Patient would like PCP set up. Call to [facility] for hospital follow up. None available in [place] or [place]. Next closest appt is in [place] with Dr Flood 5/15/24 at 1130. Phone number is [phone].  CM/SW will follow.  Electronically signed by Ben Vance RN on 4/25/2024 at 2:27 PM

## 2024-04-25 NOTE — PROGRESS NOTES
Physical Therapy    Physical Therapy Treatment    Name: Marcello Escudero  : 1955  MRN: [id number]      Date of Service: 2024    Evaluating PT:  Rg Doe, PT, DPT  [id number]     Room #:  7415/7415-A  Diagnosis:  Respiratory alkalosis [E87.3]  Pleural effusion [J90]  GI bleed [K92.2]  Demand ischemia (HCC) [I24.89]  Other ascites [R18.8]  Liver lesion [K76.9]  DONY (acute kidney injury) (HCC) [N17.9]  Acute respiratory failure with hypoxia (HCC) [J96.01]  Other cirrhosis of liver (HCC) [K74.69]  Gastrointestinal hemorrhage, unspecified gastrointestinal hemorrhage type [K92.2]  Pneumonia of both lower lobes due to infectious organism [J18.9]  Pneumonia due to infectious organism, unspecified laterality, unspecified part of lung [J18.9]  PMHx/PSHx:   has no past medical history on file.   Procedure/Surgery:  none   Precautions:  Falls, O2, NG Equipment Needs:  TBD    SUBJECTIVE:    Pt lives alone in a 1 story home with 4 stairs and 1 rail to enter.  Bedroom and bathroom are on the 1st level.  Pt ambulated with no AD PTA.    OBJECTIVE:   Initial Evaluation  Date: 24 Treatment  Date: 24 Short Term/ Long Term   Goals   AM-PAC 6 Clicks     Was pt agreeable to Eval/treatment? Yes  Yes     Does pt have pain? No c/o pain No c/o pain    Bed Mobility  Rolling: NT  Supine to sit: Min A  Sit to supine: Min A  Scooting: Min A Rolling: NT  Supine to sit: Ginger  Sit to supine: NT  Scooting: Ginger Rolling: Independent   Supine to sit: Independent   Sit to supine: Independent   Scooting: Independent    Transfers Sit to stand: Min A  Stand to sit: Min A  Stand pivot: Min A Sit to stand: Ginger (from EOB)  ModA (from standard chair)  Stand to sit: Ginger  Stand pivot: Ginger with FWW Sit to stand: Independent   Stand to sit: Independent   Stand pivot: Modified Independent  with AAD   Ambulation    25 feet with FWW Min A 15 feet x2 with FWW and Ginger >150 feet with AAD Modified Independent     Stair negotiation:  provided as needed.  Transfers - Cues provided for safety, hand/feet placement, physical assist provided as needed.  Ambulation - Cues provided for AD proximity, safety, physical assist provided as needed.  Skilled positioning - To prevent skin breakdown and contractures.  Skilled assessment of vitals.  Education - Provided for safety, role of PT in acute setting, benefits of upright mobility.    PLAN:    Patient is making Good progress towards established goals.  Will continue with current POC.      Time in  1330  Time out  1355    Total Treatment Time  25 minutes     CPT codes:  [] Gait training 36503 0 minutes  [] Manual therapy 89012 0 minutes  [x] Therapeutic activities 37714 25 minutes  [] Therapeutic exercises 35085 0 minutes  [] Neuromuscular reeducation 15405 0 minutes    Kassie Epp, PT, DPT  [id number]

## 2024-04-25 NOTE — PROGRESS NOTES
[facility] Hospitalist Progress Note    SYNOPSIS: Patient admitted on 2024 for GI bleed  68 y.o. year old male who has no past medical history on file. Who presented to ED over complaints of generalized fatigue and emesis. Patient states the complaint has been ongoing for the last month and worsened over the last few days and had dark coffee-ground emesis earlier yesterday evening. Per chart review, there was also concern for of patient having seizure-like activity per EMS. Hospital course thus far remarkable for patient with hypokalemia, lactic acidosis lactic acid 4.3, hemoglobin 6.1. CT of abdomen with significant ascites and concern for carcinomatosis, air in gallbladder, bilateral pleural effusions. General surgery was consulted who evaluated patient and recommended GI consult for possible variceal bleeding and new onset cirrhosis. Patient did receive packed red blood cells, Protonix, vancomycin and Zosyn due to possible pneumonia as well. Patient was admitted to medicine with consults placed for critical care and GI. Oncology also consulted. There is high concern for neoplastic process. IR consulted for liver biopsy and paracentesis. And thoracocentesis  -Patient had greenish emesis; NG inserted with drainage of 1 L of green liquid  -complains of dry cough   On breathing treatments and mucinex; incentive spirometry  S/p paracentesis-drainage 4.4 L of serous fluid and s/p peritoneal and  liver mass biopsy; EGD planned for     SUBJECTIVE:  Stable overnight. No other overnight issues reported.   Patient seen and examined  Records reviewed.           Temp (24hrs), Av.5 °F (36.4 °C), Min:96.3 °F (35.7 °C), Max:98.1 °F (36.7 °C)    DIET: ADULT DIET; Clear Liquid  Diet NPO Exceptions are: Sips of Water with Meds  CODE: Full Code    Intake/Output Summary (Last 24 hours) at 2024 1223  Last data filed at 2024 0644  Gross per 24 hour   Intake 380 ml   Output 1350 ml   Net -[phone]

## 2024-04-25 NOTE — PROGRESS NOTES
step(s) to enter and 1 rail(s); bed/bath on main level, basement laundry. Per pt, his brother lives next door and assists with mowing lawn.  Bathroom setup: tub/shower, stnd commode  Equipment owned: shower chair, grab bar     Prior Level of Function: Ind with ADLs;  Ind with IADLs. No AD for functional mobility.   Driving: yes  Occupation: retired- worked at [employer]     Pain Level: pt c/o no pain this session     Cognition: A&O: 4/4 ; Follows 2 step commands             Memory: G-             Comprehension: F+             Problem solving: F+             Judgement/safety: F+                Communication skills:              Vision: tracks appropriately/good eye contact                    Glasses: no                                                       Hearing: WFL- slightly [place]               RASS: 0  CAM-ICU: (NT) Delirium     UE Assessment:   Hand Dominance: Right [x]  Left []       ROM Strength STM goal: PRN   RUE  WFL 4-/5  : WFL    Increase overall strength for participation in ADLs.      LUE WFL 4-/5  : WFL    Increase overall strength for participation in ADLs.         Sensation: No c/o numbness/tingling in extremities.  Tone: WNL  Edema: Unremarkable     Functional Assessment:  AM-PAC Daily Activity Raw Score: 15/24    Initial Eval Status  Date: 4/22/24 Treatment Status  Date: 4/25/24 STGs = LTGs  Time frame: 7-14 days   Feeding NPO Set up  Sitting up in chair  Pt on clear liquid diet                      Ind         Grooming Min A  Standing at sink  Min A  Sitting EOB, unable to complete standing at sink due to limited endurance                     Ind         UB dressing/bathing Min A Min A  To don gown sitting EOB                      Supervision         LB dressing/bathing Max A  Limited reach to BLE  Would benefit from Lb AE next session    Mod A  To dof/don socks sitting EOB, education on tech and AE options to increase indep and reduce physical exertion                      SBA           Toileting Mod A  Mod A  Per last tx                     SBA      Bed Mobility  Supine to sit:   Min A     Sit to supine:   Min A Supine to sit:   Min A     Sit to supine:   NT    Vc for body mechanics and tech                      Ind      Functional Transfers Sit to stand:   Min A     Stand to sit:   Min A     Stand Pivot:   NT  Sit to stand:   Min A  From EOB  Mod A   From chair     Stand to sit:   Min A     Stand Pivot:   Min A using ww    Vc for safe hand placement for controlled ascent/descent                     Ind      Functional Mobility Min A with FWW  For short household distance. Min A with FWW  In room to/from bathroom doorway, pt limited by dizziness/fatigue                     Mod I         Balance Sitting:     Static: S    Dynamic:SBA  Standing: Min A Sitting:     Static: S    Dynamic:SBA  Standing: Min A  Sitting:     Static:  Ind    Dynamic:Ind  Standing: Mod I                   Endurance/Activity Tolerance    Fair+ tolerance with light activity.  Fair- tolerance with light activity.      /74  O2 95-99% on RA with activity                     WFL  For full ADL   Visual/  Perceptual    WFL                                 Comments: Upon arrival pt supine in bed. ADL retraining to increase safety and indep in dressing and grooming tasks, balance and trf training to increase participation in functional mobility and standing aspects of ADLs with increased safety. Pt educated on techniques to increase independence and safety during ADLs, bed mobility, and functional transfers. Pt would benefit from continued skilled OT to increase safety and independence with completion of ADL/IADL tasks for functional independence and quality of life.    At end of session pt left seated in bedside chair, call light within reach, RN notified.     Pt has made fair- progress towards set goals.     Continue with current plan of care    Treatment Time In:1330            Treatment Time Out: 1353             Treatment

## 2024-04-25 NOTE — PROGRESS NOTES
[facility]   Gastroenterology, Hepatology, &  Advanced Endoscopy      EGD/EUS  4/26/24,  pushed to Friday   - NPO tonight  - OK to resume CL diet  -tolerated liver biopsy, and paracentesis well      Reason for consult: UGI bleed/Coffee ground emesis.  ASSESSMENT AND PLAN:  -Ascites and suspicious for peritoneal nodularity concerning for carcinomatosis.  -Multiple indistinct hepatic lesions concerning for metastases.    Negative Hepatitis panel  -Hgb 10.3  -CEA 2.5  -INR 1.3  -Protonix 40 mg q 12 hours.  - LFT's essentially unremarkable except TB 1.7  - Completion of Hepatic serology  -Albumin 2.2, 75 g SPA to be given prior to paracentesis  - Chromogranin -A 86 , AFP 24,  CA 19.9  <2  - Paracentesis  w fluids for analysis, added CEA ascites fluid. Will await serology results.  - Monitor for further episodes of GI bleed  - Monitor Hgb transfuse to keep >7  10.3  - Await liver biopsy results  - No EGD in the past.  - EGD tomorrow  - NPO after midnight tonight    Fluid return was serous colored.   A total volume of  4400mL was withdrawn.   -SAAG   - SBP  79.8%    HISTORY OF PRESENT ILLNESS:    Mr. Marcello Escudero, a 68 y.o. year old male presented to ED with complaints of generalized fatigue and emesis. Patient states the complaint has been ongoing for the last month and worsened over the last few days and had dark coffee-ground emesis yesterday evening. Denies EGD in the past.     4/19/24 CT A/P w IV contrast  FINDINGS:  Lower Chest:  Evaluated on separate exam.     Organs:   There multiple indistinct low-attenuation hepatic lesions for  example image 40 measuring 1.6 cm.  Gallbladder not well distended.  Adjacent  strandy density noted and internal air.  Couple of air foci along the margin such as axial image 74 suspicious for being extrinsic, also medially cephalad to the colon..  Possible hepatic cirrhosis.     GI/Bowel: There are mid to upper dilated small bowel loops with a suggestion  of wall thickening that may

## 2024-04-25 NOTE — PROGRESS NOTES
Comprehensive Nutrition Assessment    Type and Reason for Visit:  Initial, NPO/Clear Liquid (NPO/CLD x5 days)    Nutrition Recommendations/Plan:   Continue current diet & ADAT  Start [id number] BID & Ensure clear BID    Pt is at risk for refeeding syndrome. Monitor/replace electrolytes prn      Malnutrition Assessment:  Malnutrition Status:  Severe malnutrition (04/25/24 1535)    Context:  Acute Illness     Findings of the 6 clinical characteristics of malnutrition:  Energy Intake:  50% or less of estimated energy requirements for 5 or more days  Weight Loss:  Unable to assess (large wt loss noted since admit s/p paracentesis)     Body Fat Loss:  Moderate body fat loss Orbital, Triceps   Muscle Mass Loss:  Moderate muscle mass loss Temples (temporalis), Clavicles (pectoralis & deltoids)  Fluid Accumulation:  No significant fluid accumulation     Strength:  Not Performed    Nutrition Assessment:    Pt admit 2/2 fatigue/emesis (x1 mo) & seizure ectivity, found w/ B/L pleural effusions, significant ascites & peritoneal mass concerning for carcinomatosis. Noted hepatic lesions concerning for metastatic disease. Pt s/p paracentesis (4.4L removed) w/ newly dx peritoneal carcinomatosis. Pt meets criteria for severe malnutrition. Noted NPO/CLD since admit x 5d. Diet advanced to CLD. Will add clear ONS and monitor for nutrition progression.    Nutrition Related Findings:    pt alert, active BS, abd distention, diarrhea, +2 pitting edema, +I/Os, bili 1.8 Wound Type: None       Current Nutrition Intake & Therapies:    Average Meal Intake: 1-25% (CLD)  Average Supplements Intake: None Ordered  ADULT DIET; Clear Liquid  Diet NPO Exceptions are: Sips of Water with Meds    Anthropometric Measures:  Height: 188 cm (6' 2\")  Ideal Body Weight (IBW): 190 lbs (86 kg)    Admission Body Weight: 88.5 kg (195 lb) (4/20 bed scale)  Current Body Weight: 83 kg (183 lb) (4/23 bed scale), 96.3 % IBW.    Current BMI (kg/m2): 23.5  Usual Body

## 2024-04-26 VITALS
HEART RATE: 98 BPM | TEMPERATURE: 97.5 F | BODY MASS INDEX: 21.53 KG/M2 | SYSTOLIC BLOOD PRESSURE: 113 MMHG | WEIGHT: 167.8 LBS | OXYGEN SATURATION: 95 % | HEIGHT: 74 IN | DIASTOLIC BLOOD PRESSURE: 68 MMHG | RESPIRATION RATE: 16 BRPM

## 2024-04-26 LAB
ALBUMIN SERPL-MCNC: 2.1 G/DL (ref 3.5–5.2)
ALP SERPL-CCNC: 72 U/L (ref 40–129)
ALT SERPL-CCNC: 7 U/L (ref 0–40)
ANION GAP SERPL CALCULATED.3IONS-SCNC: 14 MMOL/L (ref 7–16)
AST SERPL-CCNC: 20 U/L (ref 0–39)
BASOPHILS # BLD: 0 K/UL (ref 0–0.2)
BASOPHILS NFR BLD: 0 % (ref 0–2)
BILIRUB DIRECT SERPL-MCNC: 0.9 MG/DL (ref 0–0.3)
BILIRUB INDIRECT SERPL-MCNC: 0.6 MG/DL (ref 0–1)
BILIRUB SERPL-MCNC: 1.5 MG/DL (ref 0–1.2)
BUN SERPL-MCNC: 11 MG/DL (ref 6–23)
CALCIUM SERPL-MCNC: 7.7 MG/DL (ref 8.6–10.2)
CHLORIDE SERPL-SCNC: 104 MMOL/L (ref 98–107)
CO2 SERPL-SCNC: 20 MMOL/L (ref 22–29)
CREAT SERPL-MCNC: 0.6 MG/DL (ref 0.7–1.2)
EOSINOPHIL # BLD: 0 K/UL (ref 0.05–0.5)
EOSINOPHILS RELATIVE PERCENT: 0 % (ref 0–6)
ERYTHROCYTE [DISTWIDTH] IN BLOOD BY AUTOMATED COUNT: 22.4 % (ref 11.5–15)
GFR SERPL CREATININE-BSD FRML MDRD: >90 ML/MIN/1.73M2
GLUCOSE BLD-MCNC: 112 MG/DL (ref 74–99)
GLUCOSE BLD-MCNC: 126 MG/DL (ref 74–99)
GLUCOSE SERPL-MCNC: 129 MG/DL (ref 74–99)
HCT VFR BLD AUTO: 35.6 % (ref 37–54)
HGB BLD-MCNC: 10.7 G/DL (ref 12.5–16.5)
LYMPHOCYTES NFR BLD: 1.48 K/UL (ref 1.5–4)
LYMPHOCYTES RELATIVE PERCENT: 40 % (ref 20–42)
MAGNESIUM SERPL-MCNC: 1.8 MG/DL (ref 1.6–2.6)
MCH RBC QN AUTO: 23 PG (ref 26–35)
MCHC RBC AUTO-ENTMCNC: 30.1 G/DL (ref 32–34.5)
MCV RBC AUTO: 76.4 FL (ref 80–99.9)
MONOCYTES NFR BLD: 0.78 K/UL (ref 0.1–0.95)
MONOCYTES NFR BLD: 21 % (ref 2–12)
NEUTROPHILS NFR BLD: 39 % (ref 43–80)
NEUTS SEG NFR BLD: 1.44 K/UL (ref 1.8–7.3)
PHOSPHATE SERPL-MCNC: 2.9 MG/DL (ref 2.5–4.5)
PLATELET # BLD AUTO: 122 K/UL (ref 130–450)
PMV BLD AUTO: 9.9 FL (ref 7–12)
POTASSIUM SERPL-SCNC: 3.8 MMOL/L (ref 3.5–5)
PROT SERPL-MCNC: 5.7 G/DL (ref 6.4–8.3)
RBC # BLD AUTO: 4.66 M/UL (ref 3.8–5.8)
RBC # BLD: ABNORMAL 10*6/UL
SODIUM SERPL-SCNC: 138 MMOL/L (ref 132–146)
WBC # BLD: ABNORMAL 10*3/UL
WBC OTHER # BLD: 3.7 K/UL (ref 4.5–11.5)

## 2024-04-26 PROCEDURE — 2500000003 HC RX 250 WO HCPCS

## 2024-04-26 PROCEDURE — 7100000000 HC PACU RECOVERY - FIRST 15 MIN: Performed by: STUDENT IN AN ORGANIZED HEALTH CARE EDUCATION/TRAINING PROGRAM

## 2024-04-26 PROCEDURE — 99239 HOSP IP/OBS DSCHRG MGMT >30: CPT | Performed by: STUDENT IN AN ORGANIZED HEALTH CARE EDUCATION/TRAINING PROGRAM

## 2024-04-26 PROCEDURE — 6360000002 HC RX W HCPCS: Performed by: STUDENT IN AN ORGANIZED HEALTH CARE EDUCATION/TRAINING PROGRAM

## 2024-04-26 PROCEDURE — 3700000000 HC ANESTHESIA ATTENDED CARE: Performed by: STUDENT IN AN ORGANIZED HEALTH CARE EDUCATION/TRAINING PROGRAM

## 2024-04-26 PROCEDURE — 2720000010 HC SURG SUPPLY STERILE: Performed by: STUDENT IN AN ORGANIZED HEALTH CARE EDUCATION/TRAINING PROGRAM

## 2024-04-26 PROCEDURE — 84100 ASSAY OF PHOSPHORUS: CPT

## 2024-04-26 PROCEDURE — 3609012400 HC EGD TRANSORAL BIOPSY SINGLE/MULTIPLE: Performed by: STUDENT IN AN ORGANIZED HEALTH CARE EDUCATION/TRAINING PROGRAM

## 2024-04-26 PROCEDURE — 82248 BILIRUBIN DIRECT: CPT

## 2024-04-26 PROCEDURE — 83735 ASSAY OF MAGNESIUM: CPT

## 2024-04-26 PROCEDURE — C9113 INJ PANTOPRAZOLE SODIUM, VIA: HCPCS | Performed by: STUDENT IN AN ORGANIZED HEALTH CARE EDUCATION/TRAINING PROGRAM

## 2024-04-26 PROCEDURE — 0DB98ZX EXCISION OF DUODENUM, VIA NATURAL OR ARTIFICIAL OPENING ENDOSCOPIC, DIAGNOSTIC: ICD-10-PCS | Performed by: STUDENT IN AN ORGANIZED HEALTH CARE EDUCATION/TRAINING PROGRAM

## 2024-04-26 PROCEDURE — A4216 STERILE WATER/SALINE, 10 ML: HCPCS | Performed by: STUDENT IN AN ORGANIZED HEALTH CARE EDUCATION/TRAINING PROGRAM

## 2024-04-26 PROCEDURE — 88173 CYTOPATH EVAL FNA REPORT: CPT

## 2024-04-26 PROCEDURE — 3700000001 HC ADD 15 MINUTES (ANESTHESIA): Performed by: STUDENT IN AN ORGANIZED HEALTH CARE EDUCATION/TRAINING PROGRAM

## 2024-04-26 PROCEDURE — 0FB04ZX EXCISION OF LIVER, PERCUTANEOUS ENDOSCOPIC APPROACH, DIAGNOSTIC: ICD-10-PCS | Performed by: STUDENT IN AN ORGANIZED HEALTH CARE EDUCATION/TRAINING PROGRAM

## 2024-04-26 PROCEDURE — 88307 TISSUE EXAM BY PATHOLOGIST: CPT

## 2024-04-26 PROCEDURE — 2580000003 HC RX 258

## 2024-04-26 PROCEDURE — 88305 TISSUE EXAM BY PATHOLOGIST: CPT

## 2024-04-26 PROCEDURE — 99231 SBSQ HOSP IP/OBS SF/LOW 25: CPT

## 2024-04-26 PROCEDURE — 88342 IMHCHEM/IMCYTCHM 1ST ANTB: CPT

## 2024-04-26 PROCEDURE — 6360000002 HC RX W HCPCS

## 2024-04-26 PROCEDURE — 2580000003 HC RX 258: Performed by: STUDENT IN AN ORGANIZED HEALTH CARE EDUCATION/TRAINING PROGRAM

## 2024-04-26 PROCEDURE — 82962 GLUCOSE BLOOD TEST: CPT

## 2024-04-26 PROCEDURE — C1753 CATH, INTRAVAS ULTRASOUND: HCPCS | Performed by: STUDENT IN AN ORGANIZED HEALTH CARE EDUCATION/TRAINING PROGRAM

## 2024-04-26 PROCEDURE — 94640 AIRWAY INHALATION TREATMENT: CPT

## 2024-04-26 PROCEDURE — 85025 COMPLETE CBC W/AUTO DIFF WBC: CPT

## 2024-04-26 PROCEDURE — 88341 IMHCHEM/IMCYTCHM EA ADD ANTB: CPT

## 2024-04-26 PROCEDURE — 36415 COLL VENOUS BLD VENIPUNCTURE: CPT

## 2024-04-26 PROCEDURE — 80053 COMPREHEN METABOLIC PANEL: CPT

## 2024-04-26 PROCEDURE — 2709999900 HC NON-CHARGEABLE SUPPLY: Performed by: STUDENT IN AN ORGANIZED HEALTH CARE EDUCATION/TRAINING PROGRAM

## 2024-04-26 PROCEDURE — 7100000001 HC PACU RECOVERY - ADDTL 15 MIN: Performed by: STUDENT IN AN ORGANIZED HEALTH CARE EDUCATION/TRAINING PROGRAM

## 2024-04-26 PROCEDURE — 3609020800 HC EGD W/EUS FNA: Performed by: STUDENT IN AN ORGANIZED HEALTH CARE EDUCATION/TRAINING PROGRAM

## 2024-04-26 RX ORDER — GUAIFENESIN 400 MG/1
400 TABLET ORAL 3 TIMES DAILY
Qty: 56 TABLET | Refills: 0 | Status: SHIPPED | OUTPATIENT
Start: 2024-04-26 | End: 2024-04-26

## 2024-04-26 RX ORDER — PANTOPRAZOLE SODIUM 40 MG/1
40 TABLET, DELAYED RELEASE ORAL
Qty: 90 TABLET | Refills: 1 | Status: SHIPPED | OUTPATIENT
Start: 2024-04-26 | End: 2024-04-26

## 2024-04-26 RX ORDER — PANTOPRAZOLE SODIUM 40 MG/1
40 TABLET, DELAYED RELEASE ORAL
Qty: 90 TABLET | Refills: 1 | Status: SHIPPED | OUTPATIENT
Start: 2024-04-26

## 2024-04-26 RX ORDER — GUAIFENESIN 400 MG/1
400 TABLET ORAL 3 TIMES DAILY
Qty: 56 TABLET | Refills: 0 | Status: SHIPPED | OUTPATIENT
Start: 2024-04-26

## 2024-04-26 RX ORDER — ALBUTEROL SULFATE 90 UG/1
2 AEROSOL, METERED RESPIRATORY (INHALATION) 4 TIMES DAILY PRN
Qty: 18 G | Refills: 0 | Status: SHIPPED | OUTPATIENT
Start: 2024-04-26 | End: 2024-04-26

## 2024-04-26 RX ORDER — PROPOFOL 10 MG/ML
INJECTION, EMULSION INTRAVENOUS PRN
Status: DISCONTINUED | OUTPATIENT
Start: 2024-04-26 | End: 2024-04-26 | Stop reason: SDUPTHER

## 2024-04-26 RX ORDER — FOLIC ACID 1 MG/1
1 TABLET ORAL DAILY
Qty: 30 TABLET | Refills: 3 | Status: SHIPPED | OUTPATIENT
Start: 2024-04-26

## 2024-04-26 RX ORDER — FOLIC ACID 1 MG/1
1 TABLET ORAL DAILY
Qty: 30 TABLET | Refills: 3 | Status: SHIPPED | OUTPATIENT
Start: 2024-04-26 | End: 2024-04-26

## 2024-04-26 RX ORDER — SODIUM CHLORIDE 9 MG/ML
INJECTION, SOLUTION INTRAVENOUS CONTINUOUS PRN
Status: DISCONTINUED | OUTPATIENT
Start: 2024-04-26 | End: 2024-04-26 | Stop reason: SDUPTHER

## 2024-04-26 RX ORDER — LIDOCAINE HYDROCHLORIDE 20 MG/ML
INJECTION, SOLUTION INTRAVENOUS PRN
Status: DISCONTINUED | OUTPATIENT
Start: 2024-04-26 | End: 2024-04-26 | Stop reason: SDUPTHER

## 2024-04-26 RX ORDER — ALBUTEROL SULFATE 90 UG/1
2 AEROSOL, METERED RESPIRATORY (INHALATION) 4 TIMES DAILY PRN
Qty: 18 G | Refills: 0 | Status: SHIPPED | OUTPATIENT
Start: 2024-04-26

## 2024-04-26 RX ORDER — DEXMEDETOMIDINE HYDROCHLORIDE 100 UG/ML
INJECTION, SOLUTION INTRAVENOUS PRN
Status: DISCONTINUED | OUTPATIENT
Start: 2024-04-26 | End: 2024-04-26 | Stop reason: SDUPTHER

## 2024-04-26 RX ADMIN — PROPOFOL 30 MG: 10 INJECTION, EMULSION INTRAVENOUS at 08:14

## 2024-04-26 RX ADMIN — PROPOFOL 20 MG: 10 INJECTION, EMULSION INTRAVENOUS at 08:00

## 2024-04-26 RX ADMIN — DEXMEDETOMIDINE HCL 8 MCG: 100 INJECTION INTRAVENOUS at 07:56

## 2024-04-26 RX ADMIN — PROPOFOL 20 MG: 10 INJECTION, EMULSION INTRAVENOUS at 08:18

## 2024-04-26 RX ADMIN — SODIUM CHLORIDE: 9 INJECTION, SOLUTION INTRAVENOUS at 08:19

## 2024-04-26 RX ADMIN — DEXMEDETOMIDINE HCL 4 MCG: 100 INJECTION INTRAVENOUS at 08:10

## 2024-04-26 RX ADMIN — DEXMEDETOMIDINE HCL 4 MCG: 100 INJECTION INTRAVENOUS at 07:59

## 2024-04-26 RX ADMIN — PROPOFOL 30 MG: 10 INJECTION, EMULSION INTRAVENOUS at 08:23

## 2024-04-26 RX ADMIN — PANTOPRAZOLE SODIUM 40 MG: 40 INJECTION, POWDER, FOR SOLUTION INTRAVENOUS at 06:18

## 2024-04-26 RX ADMIN — LIDOCAINE HYDROCHLORIDE 100 MG: 20 INJECTION, SOLUTION INTRAVENOUS at 07:56

## 2024-04-26 RX ADMIN — DEXMEDETOMIDINE HCL 4 MCG: 100 INJECTION INTRAVENOUS at 08:02

## 2024-04-26 RX ADMIN — SODIUM CHLORIDE: 9 INJECTION, SOLUTION INTRAVENOUS at 07:24

## 2024-04-26 RX ADMIN — PROPOFOL 30 MG: 10 INJECTION, EMULSION INTRAVENOUS at 07:56

## 2024-04-26 RX ADMIN — ALBUTEROL SULFATE 2.5 MG: 2.5 SOLUTION RESPIRATORY (INHALATION) at 15:06

## 2024-04-26 RX ADMIN — ALBUTEROL SULFATE 2.5 MG: 2.5 SOLUTION RESPIRATORY (INHALATION) at 12:03

## 2024-04-26 RX ADMIN — PROPOFOL 20 MG: 10 INJECTION, EMULSION INTRAVENOUS at 08:10

## 2024-04-26 ASSESSMENT — LIFESTYLE VARIABLES: SMOKING_STATUS: 0

## 2024-04-26 NOTE — PROGRESS NOTES
[facility]  Hematology/Oncology  Consult  Dr. Matt Bustamante      Patient Name: Marcello Escudero  YOB: 1955  PCP: No primary care provider on file.   Referring Provider:      Reason for Consultation:   Chief Complaint   Patient presents with    Fatigue    Emesis    Seizures     Weakness for sometime, vomiting bright red blood, seizure per EMS lasting about 5-7 minutes. Now A&O x4        History of Present Illness:  This pt is a 69 yo male who initially presented to the ER with weakness, fatigue and bright red emesis. He endorses weight loss and a ~1 month feeling of malaise. CBC with normal WBC and platelets, however Hgb was 6.1, MCV 74. Iron profile with ferritin 1785, low serum iron and low TIBC. B12 normal, folate low. Retic % was 4.0. CMP with mild DONY, now improved. Lactic acid 4.3, down trended to 1.9. LFTs with T bili down to 1.5 (elevation was from direct bilirubin). CTA with moderate R and moderate to large L pleural effusion, no PE, patchy infiltrates. CT A/P with ascites and suspicion for peritoneal nodularity concerning for carcinomatosis, multiple indistinct hepatic lesions, soft tissue mass abutting duodenum and cecum. Repeat CT A/P yesterday showing multiple dilated SB up to 4.5 cm, increased compared to prior. CBC today with normal WBC and platelets, Hgb improved to 10.4, s/p 2 units pRBCs, MCV 76. Oncology consulted for further evaluation     Diagnostic Data:     No past medical history on file.    Patient Active Problem List    Diagnosis Date Noted    Severe protein-calorie malnutrition (HCC) 04/25/2024    Other cirrhosis of liver (HCC) 04/24/2024    Liver lesion 04/24/2024    Other ascites 04/24/2024    GI bleed 04/20/2024    Abdominal carcinomatosis (HCC) 04/20/2024    Metastases to the liver (HCC) 04/20/2024    Anemia, unspecified 04/19/2024        Past Surgical History:   Procedure Laterality Date    CT NEEDLE BIOPSY LIVER PERCUTANEOUS  4/24/2024    CT NEEDLE BIOPSY  Peritoneal carcinomatosis with likely liver metastasis and soft tissue mass in lower R abdomen. CTA chest and CT A/P reviewed as above. High concern for neoplastic process  -GI following.  Lower endoscopy was performed with multiple lesions were found in the visualized portion of the liver.  Appearance is consistent with metastatic disease.  Fine-needle aspiration/biopsy performed.  Low volume perihepatic ascites appreciated.  Irregular, hypoechoic mass near the right kidney.  Unable to be biopsied given difficulty with positioning due to respiratory variation in status.  This mass will be able to be biopsied in the future if needed.  Upper endoscopy was also performed with normal esophagus, Z-line regular, normal stomach, nonbleeding duodenal ulcer biopsy.  - Tumor markers including chromogranin A, AFP, CEA and Ca 19-9 were all WNL  - S/p IR liver biopsy and paracentesis. Pathology pending.   - Anemia is microcytic. Iron profile more consistent with AOCD, however he may benefit from IV as outpt pending Hgb trend  - Folate 1 mg daily  - Will follow    Electronically signed by JANNY Agee - CNP on 4/26/2024 at 1:08 PM  Patient seen, agree with above  Javier Presley MD

## 2024-04-26 NOTE — ANESTHESIA POSTPROCEDURE EVALUATION
Department of Anesthesiology  Postprocedure Note    Patient: Marcello Escudero  MRN: [id number]  YOB: 1955  Date of evaluation: 4/26/2024    Procedure Summary       Date: 04/26/24 Room / Location: [place] [address] / [facility]    Anesthesia Start: 0727 Anesthesia Stop: 0835    Procedures:       ESOPHAGOGASTRODUODENOSCOPY BIOPSY      ESOPHAGOGASTRODUODENOSCOPY ENDOSCOPIC ULTRASOUND FINE NEEDLE ASPIRATION Diagnosis:       Anemia, unspecified      (Anemia, unspecified [D64.9])    Surgeons: Hayden Carvalho MD Responsible Provider: Kate An MD    Anesthesia Type: MAC ASA Status: 3            Anesthesia Type: MAC    Clark Phase I: Clark Score: 9    Clark Phase II:      Anesthesia Post Evaluation    Patient location during evaluation: PACU  Patient participation: complete - patient participated  Level of consciousness: awake  Pain score: 0  Airway patency: patent  Nausea & Vomiting: no nausea  Cardiovascular status: hemodynamically stable  Respiratory status: acceptable  Hydration status: stable    No notable events documented.

## 2024-04-26 NOTE — CARE COORDINATION
Reviewed chart, there was a referral to [facility] on 4/25, they are able to accept but need a following physician for hhc orders. Messaged attending Dr. Sharpe, she is agreeable until pt follows up with Dr. Flood 5/15/24 at 11:30am. Plan is home at discharge with [facility].  12:45pm discharge order noted, if tolerates diet. Referral to [facility] for [facility], via message. Messaged Cuco Murillo that pt is discharging.Marita Samuel, MSW, LSW

## 2024-04-26 NOTE — PROGRESS NOTES
Palliative Care Department  [phone]  Palliative Care Progress Note  Provider Breanna Novak, JANNY - CNP      PATIENT: Marcello Escudero  : 1955  MRN: [id number]  ADMISSION DATE: 2024  8:14 PM  Referring Provider:  Bea Sharpe MD    Palliative Medicine was consulted on hospital day 6 for assistance with Goals of care    HPI:     Clinical Summary:Marcello Escudero is a 68 y.o. y/o male with no significant medical history on record who presented to [facility] on 2024 with generalized weakness, fatigue, and emesis, admitted with GI bleed.  CTAP shows significant ascites, and concern for carcinomatosis, air in gallbladder, bilateral pleural effusion.  General surgery recommended GI consult for possible variceal bleeding and new onset cirrhosis.  Plan is for liver biopsy and paracentesis today, EGD scheduled for Friday.  Oncology, and podiatry following.  Palliative medicine consulted to assist with goals of care  ASSESSMENT/PLAN:     Pertinent Hospital Diagnoses     Acute blood loss anemia  GI bleed  Concern for carcinomatosis  Hepatic lesions  Cirrhosis with ascites  Soft tissue mass in the right abdomen abutting the duodenum and second suspicious for malignancy  Hypokalemia      Palliative Care Encounter / Counseling Regarding Goals of Care  Please see detailed goals of care discussion as below  At this time, Marcello Escudero, Does have capacity for medical decision-making.  Capacity is time limited and situation/question specific  Outcome of goals of care meeting:  Continue with current medical treatment  Continue full code  HCPOA completed copy note is in soft chart  Code status Full Code  Advanced Directives: Copies of HCPOA in soft chart  Surrogate/Legal NOK:  Zia Escudero (brother/POA) [phone]    Spiritual assessment: no spiritual distress identified  Bereavement and grief: to be determined  Referrals to: none today    Thank you for the opportunity to participate in the care of Marcello

## 2024-04-26 NOTE — ANESTHESIA PRE PROCEDURE
Department of Anesthesiology  Preprocedure Note       Name:  Marcello Escudero   Age:  68 y.o.  :  1955                                          MRN:  [id number]         Date:  2024      Surgeon: Surgeon(s):  Hayden Carvalho MD    Procedure: Procedure(s):  ESOPHAGOGASTRODUODENOSCOPY DIAGNOSTIC ONLY  ESOPHAGOGASTRODUODENOSCOPY ULTRASOUND    Medications prior to admission:   Prior to Admission medications    Not on File       Current medications:    Current Facility-Administered Medications   Medication Dose Route Frequency Provider Last Rate Last Admin   • albuterol (PROVENTIL) (2.5 MG/3ML) 0.083% nebulizer solution 2.5 mg  2.5 mg Nebulization 4x Daily RT Bea Sharpe MD   2.5 mg at 24 1913   • guaiFENesin tablet 400 mg  400 mg Oral TID Bea Sharpe MD   400 mg at 24 1459   • [Held by provider] enoxaparin (LOVENOX) injection 40 mg  40 mg SubCUTAneous Daily Kassidy Alexis MD   40 mg at 24 1526   • polyethylene glycol (GoLYTELY) solution 2,000 mL  2,000 mL Oral PRN Hayden Carvalho MD       • polyethylene glycol (GLYCOLAX) packet 17 g  17 g Oral BID Jes Mayfield MD   17 g at 24 1134   • folic acid (FOLVITE) tablet 1 mg  1 mg Oral Daily Jes Mayfield MD   1 mg at 24 0836   • sodium chloride flush 0.9 % injection 5-40 mL  5-40 mL IntraVENous 2 times per day Jes Mayfield MD   10 mL at 24 0843   • sodium chloride flush 0.9 % injection 5-40 mL  5-40 mL IntraVENous PRN Jes Mayfield MD   10 mL at 24 0541   • 0.9 % sodium chloride infusion   IntraVENous PRN Jes Mayfield MD       • ondansetron (ZOFRAN-ODT) disintegrating tablet 4 mg  4 mg Oral Q8H PRN Jes Mayfield MD        Or   • ondansetron (ZOFRAN) injection 4 mg  4 mg IntraVENous Q6H PRN Jes Mayfield MD   4 mg at 24 1421   • acetaminophen (TYLENOL) tablet 650 mg  650 mg Oral Q6H PRN Jes Mayfield MD        Or   • acetaminophen (TYLENOL) suppository 650 mg  650 mg Rectal Q6H PRN Chaparro,

## 2024-04-26 NOTE — PLAN OF CARE
Problem: Discharge Planning  Goal: Discharge to home or other facility with appropriate resources  4/26/2024 0115 by Nadia Gomez RN  Outcome: Progressing     Problem: Safety - Adult  Goal: Free from fall injury  4/26/2024 0115 by Nadia Gomez RN  Outcome: Progressing     Problem: Pain  Goal: Verbalizes/displays adequate comfort level or baseline comfort level  4/26/2024 0115 by Nadia Gomez RN  Outcome: Progressing     Problem: Nutrition Deficit:  Goal: Optimize nutritional status  4/26/2024 0115 by Nadia Gomez RN  Outcome: Progressing     Problem: Skin/Tissue Integrity  Goal: Absence of new skin breakdown  Description: 1.  Monitor for areas of redness and/or skin breakdown  2.  Assess vascular access sites hourly  3.  Every 4-6 hours minimum:  Change oxygen saturation probe site  4.  Every 4-6 hours:  If on nasal continuous positive airway pressure, respiratory therapy assess nares and determine need for appliance change or resting period.  4/26/2024 0115 by Nadia Gomez RN  Outcome: Progressing     Problem: ABCDS Injury Assessment  Goal: Absence of physical injury  4/26/2024 0115 by Nadia Gomez RN  Outcome: Progressing

## 2024-04-26 NOTE — DISCHARGE SUMMARY
Hospitalist Discharge Summary    Patient ID: Marcello Escudero   Patient : 1955  Patient's PCP: No primary care provider on file.    Admit Date: 2024   Admitting Physician: Jim Almendarez MD    Discharge Date:  2024   Discharge Physician: Bea Sharpe MD   Discharge Condition: Stable  Discharge Disposition: Home      Hospital course in brief:  (Please refer to daily progress notes for a comprehensive review of the hospitalization by requesting medical records)    68 y.o. year old male who has no past medical history on file. Who presented to ED over complaints of generalized fatigue and emesis. Patient states the complaint has been ongoing for the last month and worsened over the last few days and had dark coffee-ground emesis earlier yesterday evening. Per chart review, there was also concern for of patient having seizure-like activity per EMS. Hospital course thus far remarkable for patient with hypokalemia, lactic acidosis lactic acid 4.3, hemoglobin 6.1. CT of abdomen with significant ascites and concern for carcinomatosis, air in gallbladder, bilateral pleural effusions. General surgery was consulted who evaluated patient and recommended GI consult for possible variceal bleeding and new onset cirrhosis. Patient did receive packed red blood cells, Protonix, vancomycin and Zosyn due to possible pneumonia as well. Patient was admitted to medicine with consults placed for critical care and GI. Oncology also consulted. There is high concern for neoplastic process. IR consulted for liver biopsy and paracentesis. And thoracocentesis  -Patient had greenish emesis; NG inserted with drainage of 1 L of green liquid  -complains of dry cough   On breathing treatments and mucinex; incentive spirometry  S/p paracentesis-drainage 4.4 L of serous fluid and s/p peritoneal and  liver mass biopsy; EGD done on ; cleared by gi for dc; follow up gi and oncology in a week  Recommended folate protonix  lower lobe airspace disease/pneumonia 2. Possible left pleural effusion. 3. CT of the chest is recommended.       Discharge Medications:      Medication List        START taking these medications      albuterol sulfate  (90 Base) MCG/ACT inhaler  Commonly known as: Ventolin HFA  Inhale 2 puffs into the lungs 4 times daily as needed for Wheezing     folic acid 1 MG tablet  Commonly known as: FOLVITE  Take 1 tablet by mouth daily     guaiFENesin 400 MG tablet  Take 1 tablet by mouth in the morning, at noon, and at bedtime     pantoprazole 40 MG tablet  Commonly known as: PROTONIX  Take 1 tablet by mouth every morning (before breakfast)               Where to Get Your Medications        These medications were sent to [id number] - [place], [place] - [address] - P [phone] - F [phone]   [place] [address]      Phone: [phone]   albuterol sulfate  (90 Base) MCG/ACT inhaler  folic acid 1 MG tablet  guaiFENesin 400 MG tablet  pantoprazole 40 MG tablet         Time Spent on discharge is more than 45 minutes in the examination, evaluation, counseling and review of medications and discharge plan.    +++++++++++++++++++++++++++++++++++++++++++++++++  Bea Sharpe MD  [facility] Hospitalist  [place], [place]  +++++++++++++++++++++++++++++++++++++++++++++++++  NOTE: This report was transcribed using voice recognition software. Every effort was made to ensure accuracy; however, inadvertent computerized transcription errors may be present.

## 2024-04-27 LAB
MICROORGANISM SPEC CULT: NO GROWTH
MICROORGANISM/AGENT SPEC: NORMAL
NON-GYN CYTOLOGY REPORT: NORMAL
SPECIMEN DESCRIPTION: NORMAL

## 2024-04-29 ENCOUNTER — APPOINTMENT (OUTPATIENT)
Dept: GENERAL RADIOLOGY | Age: 69
DRG: 641 | End: 2024-04-29
Payer: MEDICARE

## 2024-04-29 ENCOUNTER — HOSPITAL ENCOUNTER (INPATIENT)
Age: 69
LOS: 1 days | Discharge: NURSING, REHAB OR LONG-TERM CARE | DRG: 641 | End: 2024-04-30
Attending: EMERGENCY MEDICINE | Admitting: INTERNAL MEDICINE
Payer: MEDICARE

## 2024-04-29 DIAGNOSIS — R26.2 AMBULATORY DYSFUNCTION: ICD-10-CM

## 2024-04-29 DIAGNOSIS — R62.7 FAILURE TO THRIVE IN ADULT: ICD-10-CM

## 2024-04-29 DIAGNOSIS — E87.1 HYPONATREMIA: Primary | ICD-10-CM

## 2024-04-29 LAB
ABO + RH BLD: NORMAL
ALBUMIN SERPL-MCNC: 2 G/DL (ref 3.5–5.2)
ALP SERPL-CCNC: 90 U/L (ref 40–129)
ALT SERPL-CCNC: 8 U/L (ref 0–40)
ANION GAP SERPL CALCULATED.3IONS-SCNC: 9 MMOL/L (ref 7–16)
ARM BAND NUMBER: NORMAL
AST SERPL-CCNC: 23 U/L (ref 0–39)
BACTERIA URNS QL MICRO: ABNORMAL
BASOPHILS # BLD: 0.09 K/UL (ref 0–0.2)
BASOPHILS NFR BLD: 2 % (ref 0–2)
BILIRUB DIRECT SERPL-MCNC: 0.8 MG/DL (ref 0–0.3)
BILIRUB INDIRECT SERPL-MCNC: 0.7 MG/DL (ref 0–1)
BILIRUB SERPL-MCNC: 1.5 MG/DL (ref 0–1.2)
BILIRUB UR QL STRIP: ABNORMAL
BLOOD BANK SAMPLE EXPIRATION: NORMAL
BLOOD GROUP ANTIBODIES SERPL: NEGATIVE
BNP SERPL-MCNC: 611 PG/ML (ref 0–125)
BUN SERPL-MCNC: 14 MG/DL (ref 6–23)
CALCIUM SERPL-MCNC: 7.6 MG/DL (ref 8.6–10.2)
CHLORIDE SERPL-SCNC: 97 MMOL/L (ref 98–107)
CLARITY UR: CLEAR
CO2 SERPL-SCNC: 22 MMOL/L (ref 22–29)
COLOR UR: YELLOW
CREAT SERPL-MCNC: 0.7 MG/DL (ref 0.7–1.2)
EOSINOPHIL # BLD: 0.05 K/UL (ref 0.05–0.5)
EOSINOPHILS RELATIVE PERCENT: 1 % (ref 0–6)
ERYTHROCYTE [DISTWIDTH] IN BLOOD BY AUTOMATED COUNT: 22.5 % (ref 11.5–15)
GFR SERPL CREATININE-BSD FRML MDRD: >90 ML/MIN/1.73M2
GLUCOSE BLD-MCNC: 122 MG/DL (ref 74–99)
GLUCOSE SERPL-MCNC: 152 MG/DL (ref 74–99)
GLUCOSE UR STRIP-MCNC: NEGATIVE MG/DL
HCT VFR BLD AUTO: 36.1 % (ref 37–54)
HGB BLD-MCNC: 11 G/DL (ref 12.5–16.5)
HGB UR QL STRIP.AUTO: NEGATIVE
KETONES UR STRIP-MCNC: ABNORMAL MG/DL
LACTATE BLDV-SCNC: 1.4 MMOL/L (ref 0.5–2.2)
LEUKOCYTE ESTERASE UR QL STRIP: NEGATIVE
LIPASE SERPL-CCNC: 87 U/L (ref 13–60)
LYMPHOCYTES NFR BLD: 0.78 K/UL (ref 1.5–4)
LYMPHOCYTES RELATIVE PERCENT: 15 % (ref 20–42)
MCH RBC QN AUTO: 22.8 PG (ref 26–35)
MCHC RBC AUTO-ENTMCNC: 30.5 G/DL (ref 32–34.5)
MCV RBC AUTO: 74.7 FL (ref 80–99.9)
MONOCYTES NFR BLD: 1.01 K/UL (ref 0.1–0.95)
MONOCYTES NFR BLD: 19 % (ref 2–12)
NEUTROPHILS NFR BLD: 64 % (ref 43–80)
NEUTS SEG NFR BLD: 3.36 K/UL (ref 1.8–7.3)
NITRITE UR QL STRIP: NEGATIVE
PH UR STRIP: 6 [PH] (ref 5–9)
PLATELET # BLD AUTO: 156 K/UL (ref 130–450)
PMV BLD AUTO: 9.8 FL (ref 7–12)
POTASSIUM SERPL-SCNC: 3.9 MMOL/L (ref 3.5–5)
PROCALCITONIN SERPL-MCNC: 0.34 NG/ML (ref 0–0.08)
PROT SERPL-MCNC: 6.5 G/DL (ref 6.4–8.3)
PROT UR STRIP-MCNC: ABNORMAL MG/DL
RBC # BLD AUTO: 4.83 M/UL (ref 3.8–5.8)
RBC # BLD: ABNORMAL 10*6/UL
RBC #/AREA URNS HPF: ABNORMAL /HPF
SODIUM SERPL-SCNC: 128 MMOL/L (ref 132–146)
SP GR UR STRIP: 1.02 (ref 1–1.03)
TROPONIN I SERPL HS-MCNC: 19 NG/L (ref 0–11)
UROBILINOGEN UR STRIP-ACNC: >8 EU/DL (ref 0–1)
WBC #/AREA URNS HPF: ABNORMAL /HPF
WBC OTHER # BLD: 5.3 K/UL (ref 4.5–11.5)

## 2024-04-29 PROCEDURE — 2580000003 HC RX 258

## 2024-04-29 PROCEDURE — 83880 ASSAY OF NATRIURETIC PEPTIDE: CPT

## 2024-04-29 PROCEDURE — 99285 EMERGENCY DEPT VISIT HI MDM: CPT

## 2024-04-29 PROCEDURE — 93005 ELECTROCARDIOGRAM TRACING: CPT

## 2024-04-29 PROCEDURE — 80053 COMPREHEN METABOLIC PANEL: CPT

## 2024-04-29 PROCEDURE — 83690 ASSAY OF LIPASE: CPT

## 2024-04-29 PROCEDURE — 82962 GLUCOSE BLOOD TEST: CPT

## 2024-04-29 PROCEDURE — 82248 BILIRUBIN DIRECT: CPT

## 2024-04-29 PROCEDURE — 1200000000 HC SEMI PRIVATE

## 2024-04-29 PROCEDURE — 2580000003 HC RX 258: Performed by: INTERNAL MEDICINE

## 2024-04-29 PROCEDURE — 81001 URINALYSIS AUTO W/SCOPE: CPT

## 2024-04-29 PROCEDURE — 86850 RBC ANTIBODY SCREEN: CPT

## 2024-04-29 PROCEDURE — 86900 BLOOD TYPING SEROLOGIC ABO: CPT

## 2024-04-29 PROCEDURE — 71045 X-RAY EXAM CHEST 1 VIEW: CPT

## 2024-04-29 PROCEDURE — 84145 PROCALCITONIN (PCT): CPT

## 2024-04-29 PROCEDURE — 84484 ASSAY OF TROPONIN QUANT: CPT

## 2024-04-29 PROCEDURE — 83605 ASSAY OF LACTIC ACID: CPT

## 2024-04-29 PROCEDURE — 99222 1ST HOSP IP/OBS MODERATE 55: CPT | Performed by: INTERNAL MEDICINE

## 2024-04-29 PROCEDURE — 85025 COMPLETE CBC W/AUTO DIFF WBC: CPT

## 2024-04-29 PROCEDURE — 86901 BLOOD TYPING SEROLOGIC RH(D): CPT

## 2024-04-29 RX ORDER — POTASSIUM CHLORIDE 7.45 MG/ML
10 INJECTION INTRAVENOUS PRN
Status: DISCONTINUED | OUTPATIENT
Start: 2024-04-29 | End: 2024-04-30 | Stop reason: HOSPADM

## 2024-04-29 RX ORDER — ONDANSETRON 4 MG/1
4 TABLET, ORALLY DISINTEGRATING ORAL EVERY 8 HOURS PRN
Status: DISCONTINUED | OUTPATIENT
Start: 2024-04-29 | End: 2024-04-30 | Stop reason: HOSPADM

## 2024-04-29 RX ORDER — 0.9 % SODIUM CHLORIDE 0.9 %
500 INTRAVENOUS SOLUTION INTRAVENOUS ONCE
Status: COMPLETED | OUTPATIENT
Start: 2024-04-29 | End: 2024-04-29

## 2024-04-29 RX ORDER — ENOXAPARIN SODIUM 100 MG/ML
40 INJECTION SUBCUTANEOUS DAILY
Status: DISCONTINUED | OUTPATIENT
Start: 2024-04-30 | End: 2024-04-30 | Stop reason: HOSPADM

## 2024-04-29 RX ORDER — ACETAMINOPHEN 325 MG/1
650 TABLET ORAL EVERY 6 HOURS PRN
Status: DISCONTINUED | OUTPATIENT
Start: 2024-04-29 | End: 2024-04-30 | Stop reason: HOSPADM

## 2024-04-29 RX ORDER — ALBUTEROL SULFATE 2.5 MG/3ML
2.5 SOLUTION RESPIRATORY (INHALATION) 4 TIMES DAILY PRN
Status: DISCONTINUED | OUTPATIENT
Start: 2024-04-29 | End: 2024-04-30 | Stop reason: HOSPADM

## 2024-04-29 RX ORDER — ALBUTEROL SULFATE 90 UG/1
2 AEROSOL, METERED RESPIRATORY (INHALATION) 4 TIMES DAILY PRN
Status: DISCONTINUED | OUTPATIENT
Start: 2024-04-29 | End: 2024-04-29 | Stop reason: CLARIF

## 2024-04-29 RX ORDER — ACETAMINOPHEN 650 MG/1
650 SUPPOSITORY RECTAL EVERY 6 HOURS PRN
Status: DISCONTINUED | OUTPATIENT
Start: 2024-04-29 | End: 2024-04-30 | Stop reason: HOSPADM

## 2024-04-29 RX ORDER — SODIUM CHLORIDE 9 MG/ML
INJECTION, SOLUTION INTRAVENOUS PRN
Status: DISCONTINUED | OUTPATIENT
Start: 2024-04-29 | End: 2024-04-30 | Stop reason: HOSPADM

## 2024-04-29 RX ORDER — PANTOPRAZOLE SODIUM 40 MG/1
40 TABLET, DELAYED RELEASE ORAL
Status: DISCONTINUED | OUTPATIENT
Start: 2024-04-30 | End: 2024-04-30 | Stop reason: HOSPADM

## 2024-04-29 RX ORDER — SODIUM CHLORIDE 0.9 % (FLUSH) 0.9 %
5-40 SYRINGE (ML) INJECTION EVERY 12 HOURS SCHEDULED
Status: DISCONTINUED | OUTPATIENT
Start: 2024-04-29 | End: 2024-04-30 | Stop reason: HOSPADM

## 2024-04-29 RX ORDER — POTASSIUM CHLORIDE 20 MEQ/1
40 TABLET, EXTENDED RELEASE ORAL PRN
Status: DISCONTINUED | OUTPATIENT
Start: 2024-04-29 | End: 2024-04-30 | Stop reason: HOSPADM

## 2024-04-29 RX ORDER — POLYETHYLENE GLYCOL 3350 17 G/17G
17 POWDER, FOR SOLUTION ORAL DAILY PRN
Status: DISCONTINUED | OUTPATIENT
Start: 2024-04-29 | End: 2024-04-30 | Stop reason: HOSPADM

## 2024-04-29 RX ORDER — ONDANSETRON 2 MG/ML
4 INJECTION INTRAMUSCULAR; INTRAVENOUS EVERY 6 HOURS PRN
Status: DISCONTINUED | OUTPATIENT
Start: 2024-04-29 | End: 2024-04-30 | Stop reason: HOSPADM

## 2024-04-29 RX ORDER — SODIUM CHLORIDE 0.9 % (FLUSH) 0.9 %
5-40 SYRINGE (ML) INJECTION PRN
Status: DISCONTINUED | OUTPATIENT
Start: 2024-04-29 | End: 2024-04-30 | Stop reason: HOSPADM

## 2024-04-29 RX ORDER — MAGNESIUM SULFATE IN WATER 40 MG/ML
2000 INJECTION, SOLUTION INTRAVENOUS PRN
Status: DISCONTINUED | OUTPATIENT
Start: 2024-04-29 | End: 2024-04-30 | Stop reason: HOSPADM

## 2024-04-29 RX ORDER — FOLIC ACID 1 MG/1
1 TABLET ORAL DAILY
Status: DISCONTINUED | OUTPATIENT
Start: 2024-04-30 | End: 2024-04-30 | Stop reason: HOSPADM

## 2024-04-29 RX ADMIN — SODIUM CHLORIDE 500 ML: 9 INJECTION, SOLUTION INTRAVENOUS at 11:35

## 2024-04-29 RX ADMIN — SODIUM CHLORIDE, PRESERVATIVE FREE 10 ML: 5 INJECTION INTRAVENOUS at 22:41

## 2024-04-29 ASSESSMENT — LIFESTYLE VARIABLES: HOW MANY STANDARD DRINKS CONTAINING ALCOHOL DO YOU HAVE ON A TYPICAL DAY: PATIENT DOES NOT DRINK

## 2024-04-29 ASSESSMENT — PAIN - FUNCTIONAL ASSESSMENT: PAIN_FUNCTIONAL_ASSESSMENT: NONE - DENIES PAIN

## 2024-04-29 ASSESSMENT — PAIN SCALES - GENERAL: PAINLEVEL_OUTOF10: 0

## 2024-04-29 NOTE — ED PROVIDER NOTES
[facility] EMERGENCY DEPARTMENT  EMERGENCY DEPARTMENT ENCOUNTER        Pt Name: Marcello Escudero  MRN: [id number]  Birthdate 1955  Date of evaluation: 4/29/2024  Provider: Mahin Buchanan DO  PCP: No primary care provider on file.  Note Started: 11:09 AM EDT 4/29/24    CHIEF COMPLAINT       Chief Complaint   Patient presents with    Fatigue     Weakness, tired, just here Friday, had paracentesis sent home getting weaker may be filling back up, ABD distention, denies pain, visiting nurse sent in because needs rehab facility        HISTORY OF PRESENT ILLNESS: 1 or more Elements   History From: patient    Limitations to history : None    Marcello Escudero is a 68 y.o. male who presents to emergency department for generalized fatigue over the past 3 days.  Patient reports that he was admitted for 7 days with GI bleed and peritoneal carcinomatosis with likely liver metastasis and soft tissue mass in the right lower abdomen.  Since being home he reports he is progressively gotten more weak and home health nurse come to the emergency department for evaluation with them progressively getting worse and his belly getting larger.  He does not believe he has had any blood in his stool or black tarry stools. He did have a paracentesis while inpatient. Patient denies fever, chills, headache, shortness of breath, chest pain, nausea, vomiting, diarrhea, dysuria, hematuria, hematochezia, and melena.     Nursing Notes were all reviewed and agreed with or any disagreements were addressed in the HPI.          REVIEW OF SYSTEMS :           Positives and Pertinent negatives as per HPI.     SURGICAL HISTORY     Past Surgical History:   Procedure Laterality Date    CT NEEDLE BIOPSY LIVER PERCUTANEOUS  4/24/2024    CT NEEDLE BIOPSY LIVER PERCUTANEOUS 4/24/2024 ReillyJERICA MD [facility] CT    EYE SURGERY      lens implant rt eye    PARACENTESIS Left 04/24/2024    4400 ml serous removed.    UPPER

## 2024-04-29 NOTE — H&P
the lungs 4 times daily as needed for Wheezing 4/26/24   Bea Sharpe MD   pantoprazole (PROTONIX) 40 MG tablet Take 1 tablet by mouth every morning (before breakfast) 4/26/24   Bea Sharpe MD   folic acid (FOLVITE) 1 MG tablet Take 1 tablet by mouth daily 4/26/24   Bea Sharpe MD   guaiFENesin 400 MG tablet Take 1 tablet by mouth in the morning, at noon, and at bedtime 4/26/24   Bea Sharpe MD       Allergies:    Patient has no known allergies.    Social History:    reports that he has quit smoking. His smoking use included cigarettes. He has a 20.0 pack-year smoking history. He has never used smokeless tobacco. He reports that he does not drink alcohol and does not use drugs.    Family History:   family history includes Diabetes in his father.       PHYSICAL EXAM:  Vitals:  /82   Pulse (!) 108   Temp 97.4 °F (36.3 °C)   Resp 16   SpO2 93%     General Appearance: Looks ill alert and oriented to person, place and time.  Skin: warm and dry  Head: normocephalic and atraumatic  Eyes: pupils equal, round, and reactive to light, extraocular eye movements intact, conjunctivae normal  Neck: neck supple and non tender without mass   Pulmonary/Chest: clear to auscultation bilaterally- no wheezes, rales or rhonchi, normal air movement, no respiratory distress  Cardiovascular: normal rate, normal S1 and S2 and no carotid bruits  Abdomen: Distended, bowel sounds present.  Extremities: no cyanosis, no clubbing and no edema  Neurologic: no cranial nerve deficit and speech normal        LABS:  Recent Labs     04/29/24  1115   *   K 3.9   CL 97*   CO2 22   BUN 14   CREATININE 0.7   GLUCOSE 152*   CALCIUM 7.6*       Recent Labs     04/29/24  1115   WBC 5.3   RBC 4.83   HGB 11.0*   HCT 36.1*   MCV 74.7*   MCH 22.8*   MCHC 30.5*   RDW 22.5*      MPV 9.8       No results for input(s): \"POCGLU\" in the last 72 hours.        Radiology:   XR CHEST PORTABLE   Final Result   1. Cardiomegaly.

## 2024-04-30 VITALS
TEMPERATURE: 97.6 F | SYSTOLIC BLOOD PRESSURE: 109 MMHG | HEIGHT: 74 IN | DIASTOLIC BLOOD PRESSURE: 69 MMHG | OXYGEN SATURATION: 93 % | BODY MASS INDEX: 28.23 KG/M2 | WEIGHT: 220 LBS | RESPIRATION RATE: 18 BRPM | HEART RATE: 109 BPM

## 2024-04-30 LAB
ANION GAP SERPL CALCULATED.3IONS-SCNC: 15 MMOL/L (ref 7–16)
BASOPHILS # BLD: 0.17 K/UL (ref 0–0.2)
BASOPHILS NFR BLD: 3 % (ref 0–2)
BUN SERPL-MCNC: 13 MG/DL (ref 6–23)
CALCIUM SERPL-MCNC: 7.5 MG/DL (ref 8.6–10.2)
CHLORIDE SERPL-SCNC: 100 MMOL/L (ref 98–107)
CO2 SERPL-SCNC: 19 MMOL/L (ref 22–29)
CREAT SERPL-MCNC: 0.7 MG/DL (ref 0.7–1.2)
EOSINOPHIL # BLD: 0 K/UL (ref 0.05–0.5)
EOSINOPHILS RELATIVE PERCENT: 0 % (ref 0–6)
ERYTHROCYTE [DISTWIDTH] IN BLOOD BY AUTOMATED COUNT: 22.5 % (ref 11.5–15)
GFR SERPL CREATININE-BSD FRML MDRD: >90 ML/MIN/1.73M2
GLUCOSE SERPL-MCNC: 119 MG/DL (ref 74–99)
HCT VFR BLD AUTO: 32.6 % (ref 37–54)
HGB BLD-MCNC: 10.4 G/DL (ref 12.5–16.5)
LYMPHOCYTES NFR BLD: 0.66 K/UL (ref 1.5–4)
LYMPHOCYTES RELATIVE PERCENT: 12 % (ref 20–42)
MCH RBC QN AUTO: 24.1 PG (ref 26–35)
MCHC RBC AUTO-ENTMCNC: 31.9 G/DL (ref 32–34.5)
MCV RBC AUTO: 75.5 FL (ref 80–99.9)
MONOCYTES NFR BLD: 0.72 K/UL (ref 0.1–0.95)
MONOCYTES NFR BLD: 13 % (ref 2–12)
NEUTROPHILS NFR BLD: 72 % (ref 43–80)
NEUTS SEG NFR BLD: 3.96 K/UL (ref 1.8–7.3)
NUCLEATED RED BLOOD CELLS: 2 PER 100 WBC
PLATELET # BLD AUTO: 156 K/UL (ref 130–450)
PMV BLD AUTO: 10.2 FL (ref 7–12)
POTASSIUM SERPL-SCNC: 3.9 MMOL/L (ref 3.5–5)
RBC # BLD AUTO: 4.32 M/UL (ref 3.8–5.8)
RBC # BLD: ABNORMAL 10*6/UL
SODIUM SERPL-SCNC: 134 MMOL/L (ref 132–146)
SURGICAL PATHOLOGY REPORT: NORMAL
WBC OTHER # BLD: 5.5 K/UL (ref 4.5–11.5)

## 2024-04-30 PROCEDURE — 36415 COLL VENOUS BLD VENIPUNCTURE: CPT

## 2024-04-30 PROCEDURE — 6370000000 HC RX 637 (ALT 250 FOR IP): Performed by: INTERNAL MEDICINE

## 2024-04-30 PROCEDURE — 6360000002 HC RX W HCPCS: Performed by: INTERNAL MEDICINE

## 2024-04-30 PROCEDURE — 80048 BASIC METABOLIC PNL TOTAL CA: CPT

## 2024-04-30 PROCEDURE — 99239 HOSP IP/OBS DSCHRG MGMT >30: CPT | Performed by: INTERNAL MEDICINE

## 2024-04-30 PROCEDURE — 99232 SBSQ HOSP IP/OBS MODERATE 35: CPT | Performed by: INTERNAL MEDICINE

## 2024-04-30 PROCEDURE — 85025 COMPLETE CBC W/AUTO DIFF WBC: CPT

## 2024-04-30 PROCEDURE — 2580000003 HC RX 258: Performed by: INTERNAL MEDICINE

## 2024-04-30 PROCEDURE — 97530 THERAPEUTIC ACTIVITIES: CPT

## 2024-04-30 PROCEDURE — 97161 PT EVAL LOW COMPLEX 20 MIN: CPT

## 2024-04-30 RX ADMIN — SODIUM CHLORIDE, PRESERVATIVE FREE 10 ML: 5 INJECTION INTRAVENOUS at 08:14

## 2024-04-30 RX ADMIN — ENOXAPARIN SODIUM 40 MG: 100 INJECTION SUBCUTANEOUS at 08:14

## 2024-04-30 RX ADMIN — PANTOPRAZOLE SODIUM 40 MG: 40 TABLET, DELAYED RELEASE ORAL at 06:30

## 2024-04-30 RX ADMIN — FOLIC ACID 1 MG: 1 TABLET ORAL at 08:14

## 2024-04-30 ASSESSMENT — PAIN SCALES - GENERAL: PAINLEVEL_OUTOF10: 0

## 2024-04-30 NOTE — DISCHARGE INSTR - COC
Continuity of Care Form    Patient Name: Marcello Escudero   :  1955  MRN:  [id number]    Admit date:  2024  Discharge date:  2024    Code Status Order: Full Code   Advance Directives:     Admitting Physician:  Sean Sanz MD  PCP: No primary care provider on file.    Discharging Nurse: APRYL Looney   Discharging Hospital Unit/Room#: 8422/8422-B  Discharging Unit Phone Number: [phone]    Emergency Contact:   Extended Emergency Contact Information  Primary Emergency Contact: micah Escudero  Mobile Phone: [phone]  Relation: Brother/Sister  Secondary Emergency Contact: Petar Martinez  Mobile Phone: [phone]  Relation: Niece/Nephew    Past Surgical History:  Past Surgical History:   Procedure Laterality Date    CT NEEDLE BIOPSY LIVER PERCUTANEOUS  2024    CT NEEDLE BIOPSY LIVER PERCUTANEOUS 2024 [place]ERICA [place]ver [place] [facility] [place]    EYE SURGERY      lens implant rt eye    PARACENTESIS Left 2024    4400 ml serous removed.    UPPER GASTROINTESTINAL ENDOSCOPY N/A 2024    ESOPHAGOGASTRODUODENOSCOPY BIOPSY performed by Hayden Carvalho MD at [facility]    UPPER GASTROINTESTINAL ENDOSCOPY N/A 2024    ESOPHAGOGASTRODUODENOSCOPY ENDOSCOPIC ULTRASOUND FINE NEEDLE ASPIRATION performed by Hayden aCrvalho MD at [facility]       Immunization History:   Immunization History   Administered Date(s) Administered    COVID-19, MODERNA Bivalent, (age 12y+), IM, 50 mcg/0.5 mL 10/25/2022    COVID-19, PFIZER, ( formula), (age 12y+), IM, 30mcg/0.3mL 2023       Active Problems:  Patient Active Problem List   Diagnosis Code    GI bleed K92.2    Abdominal carcinomatosis (HCC) C76.2    Metastases to the liver (HCC) C78.7    Anemia, unspecified D64.9    Other cirrhosis of liver (HCC) K74.69    Liver lesion K76.9    Other ascites R18.8    Severe protein-calorie malnutrition (HCC) E43    Adult failure to thrive R62.7       Isolation/Infection:   Isolation            No Isolation

## 2024-04-30 NOTE — PLAN OF CARE
Problem: Skin/Tissue Integrity  Goal: Absence of new skin breakdown  Description: 1.  Monitor for areas of redness and/or skin breakdown  2.  Assess vascular access sites hourly  3.  Every 4-6 hours minimum:  Change oxygen saturation probe site  4.  Every 4-6 hours:  If on nasal continuous positive airway pressure, respiratory therapy assess nares and determine need for appliance change or resting period.  Outcome: Adequate for Discharge     Problem: Safety - Adult  Goal: Free from fall injury  Outcome: Adequate for Discharge

## 2024-04-30 NOTE — CARE COORDINATION
AM-PAC:   /24  OT AM-PAC:   /24    Family can provide assistance at DC: Yes  Would you like Case Management to discuss the discharge plan with any other family members/significant others, and if so, who? No  Plans to Return to Present Housing: Yes  Other Identified Issues/Barriers to RETURNING to current housing:   Potential Assistance needed at discharge: N/A            Potential DME:    Patient expects to discharge to: House  Plan for transportation at discharge:      Financial    Payor: MEDICARE / Plan: MEDICARE PART A AND B / Product Type: *No Product type* /     Does insurance require precert for SNF: No    Potential assistance Purchasing Medications:    Meds-to-Beds request: Yes      [id number] - [place], [place] - [address] [facility] [phone] - F [phone]  [address] [facility]  [place] [address]  Phone: [phone] Fax: [phone]    [id number] - [place], [place] - [address] - P [phone] - F [phone]  [address] [place], [place] [address]  [facility] [address]  Phone: [phone] Fax: [phone]      Notes:    Factors facilitating achievement of predicted outcomes: Family support and Cooperative    Barriers to discharge: Limited insight into deficits, Decreased endurance, Upper extremity weakness, Lower extremity weakness, and Long standing deficits    Additional Case Management Notes:     The Plan for Transition of Care is related to the following treatment goals of Adult failure to thrive [R62.7]  Hyponatremia [E87.1]  Failure to thrive in adult [R62.7]  Ambulatory dysfunction [R26.2]    IF APPLICABLE: The Patient and/or patient representative Marcello and his family were provided with a choice of provider and agrees with the discharge plan. Freedom of choice list with basic dialogue that supports the patient's individualized plan of care/goals and shares the quality data associated with the providers was provided to:     Patient Representative Name:       The Patient and/or Patient Representative

## 2024-04-30 NOTE — ACP (ADVANCE CARE PLANNING)
Advance Care Planning   The patient has the following advanced directives on file:  Advance Directives       Power of  Living Will ACP-Advance Directive ACP-Power of     Not on File Not on File Not on File Not on File            The patient has appointed the following active healthcare agents:    Primary Decision Maker: micah Escudero - Brother/Sister - [phone]    Primary Decision Maker: one,no - Parent      BARBRA Philip  4/30/2024

## 2024-04-30 NOTE — PROGRESS NOTES
4 Eyes Skin Assessment     NAME:  Marcello Escudero  YOB: 1955  MEDICAL RECORD NUMBER:  [id number]    The patient is being assessed for  Admission    I agree that at least one RN has performed a thorough Head to Toe Skin Assessment on the patient. ALL assessment sites listed below have been assessed.      Areas assessed by both nurses:    Head, Face, Ears, Shoulders, Back, Chest, Arms, Elbows, Hands, Sacrum. Buttock, Coccyx, Ischium, Legs. Feet and Heels, and Under Medical Devices         Does the Patient have a Wound? No noted wound(s)       Merrill Prevention initiated by RN: No  Wound Care Orders initiated by RN: No    Pressure Injury (Stage 3,4, Unstageable, DTI, NWPT, and Complex wounds) if present, place Wound referral order by RN under : No    New Ostomies, if present place, Ostomy referral order under : No     Nurse 1 eSignature: Electronically signed by Vishnu Thomas RN on 4/29/24 at 10:53 PM EDT    **SHARE this note so that the co-signing nurse can place an eSignature**    Nurse 2 eSignature: Electronically signed by Vanessa Morales on 4/29/24 at 11:50 PM EDT

## 2024-04-30 NOTE — PROGRESS NOTES
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  Bed/bath is on 1st floor.  Pt ambulated with no AD PTA.    OBJECTIVE:   Initial Evaluation  Date: 24 Treatment Short Term/ Long Term   Goals   AM-PAC 6 Clicks      Was pt agreeable to Eval/treatment? yes     Does pt have pain? No pain     Bed Mobility  Rolling: SBA  Supine to sit: min A  Sit to supine: NT  Scooting: min A  Rolling: mod I  Supine to sit: mod I  Sit to supine: mod I  Scooting: mod I   Transfers Sit to stand: max A  Stand to sit: min A  Stand pivot: min A with ww  Sit to stand: mod I  Stand to sit: mod I  Stand pivot: mod I with AAD   Ambulation    2' with ww min A  50'+ with AAD mod I   Stair negotiation: ascended and descended  NT  4 steps with B handrail mod I     Strength/ROM:   BLE grossly 3+/5  BLE AROM WFL    Balance:   Static Sitting: SBA  Dynamic Sitting: SBA  Static Standing: CGA with ww  Dynamic Standing: min A with ww    Pt is A & O x 3  Sensation:  Pt denies numbness and tingling to extremities  Edema: abdominal swelling    Vitals:  SpO2 and HR were stable during session    Therapeutic Exercises:    Bed mobility: supine>sit, cued for EOB positioning  Transfers: STS x2, cued for hand placement and postural correction  Ambulation: 2' with ww  BLE AROM    Patient education  Pt educated on role of PT, importance of functional mobility during hospital stay, safety with functional

## 2024-04-30 NOTE — PROGRESS NOTES
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 He was recently admitted due to generalized fatigue and emesis. CT of abdomen with significant ascites and concern for carcinomatosis, air in gallbladder, bilateral pleural effusions. GI and oncology were consulted and patient underwent IR guided liver biopsy and paracentesis. Biopsy results remain pending. Patient presented back to the hospital due to generalized debilitation and difficulty at home. PT/OT consulted for placement evaluation.     SUBJECTIVE:  Stable overnight. No other overnight issues reported.   Patient seen and examined  Records reviewed.       Temp (24hrs), Av.7 °F (36.5 °C), Min:97.6 °F (36.4 °C), Max:97.8 °F (36.6 °C)    DIET: ADULT DIET; Regular  CODE: Full Code    Intake/Output Summary (Last 24 hours) at 2024 1106  Last data filed at 2024 1056  Gross per 24 hour   Intake 240 ml   Output 150 ml   Net 90 ml       Review of Systems  All bolded are positive; please see HPI  General:  Fever, chills, diaphoresis, fatigue, malaise, night sweats, weight loss  Psychological:  Anxiety, disorientation, hallucinations.  ENT:  Epistaxis, headaches, vertigo, visual changes.  Cardiovascular:  Chest pain, irregular heartbeats, palpitations, paroxysmal nocturnal dyspnea.  Respiratory:  Shortness of breath, coughing, sputum production, hemoptysis, wheezing, orthopnea.  Gastrointestinal:  Nausea, vomiting, diarrhea, heartburn, constipation, abdominal pain, hematemesis, hematochezia, melena, acholic stools  Genito-Urinary:  Dysuria, urgency, frequency, hematuria  Musculoskeletal:  Joint pain, joint stiffness, joint swelling, muscle pain  Neurology:  Headache, focal neurological deficits, weakness, numbness, paresthesia  Derm:

## 2024-04-30 NOTE — DISCHARGE SUMMARY
duodenum.        -  The patient tolerated the procedure well. Findings:        -ENDOSONOGRAPHIC FINDING:           - Diffuse atrophy and fatty infiltration noted in the entire pancreas           making for limited exam. No lilian masses appreciated.           -  There was no sign of significant endosonographic abnormality in the           common bile duct.           - Multiple round lesions were identified endosonographically in the           visualized portion of the liver.  Associated low-volume francis-hepatic ascites.           The endosonographic appearance was suggestive of metastases.  The lesions were           hypoechoic.  Fine needle biopsy was performed.  Color Doppler imaging was           utilized prior to needle puncture to confirm a lack of significant vascular           structures within the needle path.  Two passes were made with the 22 gauge           Acquire biopsy needle using a transgastric approach.           - An irregular mass was identified endosonographically in the space of the           right kidney.  It was difficult to discern if the lesion was an irregular part           of the kidney or a separate lesion. The mass was heterogenous and hypoechoic.           It was difficult to keep the lesion in focus given respiratory variation. The           patient began having issues with secretions and the lesion could not be           biopsied during this procedure. Impression:        - Diffuse atrophy/fatty infiltration noted in the entire pancreas.        -  There was no sign of significant pathology in the common bile duct.        - Multiple lesions were found in the visualized portion of the liver.           Appearance is consistent with metastatic disease. Fine needle aspiration/biopsy           performed. Low-volume per-hepatic ascites appreciated.       Irregular, hypoechoic mass near right kidney. Unable to be biopsied given       difficult positioning but moreso due to respiratory

## 2024-05-01 LAB
EKG ATRIAL RATE: 107 BPM
EKG P AXIS: 23 DEGREES
EKG P-R INTERVAL: 152 MS
EKG Q-T INTERVAL: 348 MS
EKG QRS DURATION: 78 MS
EKG QTC CALCULATION (BAZETT): 464 MS
EKG R AXIS: 29 DEGREES
EKG T AXIS: 57 DEGREES
EKG VENTRICULAR RATE: 107 BPM
NON-GYN CYTOLOGY REPORT: NORMAL

## 2024-05-01 PROCEDURE — 93010 ELECTROCARDIOGRAM REPORT: CPT | Performed by: INTERNAL MEDICINE

## 2024-05-03 LAB — SURGICAL PATHOLOGY REPORT: NORMAL

## 2024-05-08 ENCOUNTER — HOSPITAL ENCOUNTER (OUTPATIENT)
Dept: INTERVENTIONAL RADIOLOGY/VASCULAR | Age: 69
Discharge: HOME | End: 2024-05-10
Payer: MEDICARE

## 2024-05-08 VITALS
SYSTOLIC BLOOD PRESSURE: 103 MMHG | TEMPERATURE: 97.8 F | HEART RATE: 114 BPM | DIASTOLIC BLOOD PRESSURE: 70 MMHG | RESPIRATION RATE: 18 BRPM | OXYGEN SATURATION: 96 %

## 2024-05-08 DIAGNOSIS — K70.31 ALCOHOLIC CIRRHOSIS OF LIVER WITH ASCITES (HCC): ICD-10-CM

## 2024-05-08 PROBLEM — C22.1 INTRAHEPATIC BILE DUCT CARCINOMA (HCC): Status: ACTIVE | Noted: 2024-05-08

## 2024-05-08 PROCEDURE — 2709999900 IR US GUIDED PARACENTESIS

## 2024-05-08 PROCEDURE — 2500000003 HC RX 250 WO HCPCS: Performed by: PHYSICIAN ASSISTANT

## 2024-05-08 PROCEDURE — 49083 ABD PARACENTESIS W/IMAGING: CPT

## 2024-05-08 RX ORDER — LIDOCAINE HYDROCHLORIDE 20 MG/ML
INJECTION, SOLUTION INFILTRATION; PERINEURAL PRN
Status: COMPLETED | OUTPATIENT
Start: 2024-05-08 | End: 2024-05-08

## 2024-05-08 RX ADMIN — LIDOCAINE HYDROCHLORIDE 10 ML: 20 INJECTION, SOLUTION INFILTRATION; PERINEURAL at 12:08

## 2024-05-08 NOTE — BRIEF OP NOTE
Brief-Op Note  ______________________________________________________________      IR U/S GUIDED PARACENTESIS  [facility] SPECIAL PROCEDURES    Patient Name: Marcello Escudero   YOB: 1955  Medical Record Number: [id number]  Date of Procedure: 5/8/24  Room/Bed: Room/bed info not found      Pre-operative Diagnosis: Ascites    Post-operative Diagnosis: Ascites    Consent: Informed consent was obtained from the patient and spouse prior to the procedure. The details of the procedure, as well is its risks, benefits, and alternatives, were explained.      Anesthesia: Local anesthesia.    Performed by: RODOLFO Pugh under on-site supervision by Get Grover MD.    Estimated blood loss: Minimal    Complications: None    Specimen Obtained: 2450mL of dark yellow colored ascites fluid was withdrawn.    (See radiology dictation in PACs for image review and additional procedural information)    Electronically signed by RODOLFO Pugh   DD: 5/8/24  3:53 PM

## 2024-05-08 NOTE — OP NOTE
Operative Note  ______________________________________________________________      IR U/S GUIDED PARACENTESIS  [facility] SPECIAL PROCEDURES    Patient Name: Marcello Escudero   YOB: 1955  Medical Record Number: [id number]  Date of Procedure: 5/8/24  Room/Bed: Room/bed info not found    Pre-operative Diagnosis: Ascites    Post-operative Diagnosis: Ascites    Consent: Informed consent was obtained from the patient and spouse prior to the procedure. The details of the procedure, as well is its risks, benefits, and alternatives, were explained.      Anesthesia: Local anesthesia with approximately 10mL of 2% Lidocaine without epinephrine administered subcutaneously.    Performed by: RODOLFO Pugh under on-site supervision by Get Grover MD.    Estimated blood loss: Minimal    Complications: None    Specimens Obtained: Ascites Fluid    Procedure: Routine scanning of all four abdominal quadrants was performed using real-time ultrasound and revealed sufficient amount of ascites fluid present.  Decision was made to proceed with procedure.  After obtaining consent, a \"Time-Out\" was called to verify the correct patient, procedure/location, allergies, relevant medications held for procedure and that all equipment is functioning and available. The patient was then placed in the supine position with the head of the bed slightly elevated and the appropriate landmarks were identified. The skin over the puncture site in the left lower quadrant region was prepped with betadine and draped in a sterile fashion. Local anesthesia was obtained by infiltration using 2% Lidocaine without epinephrine. A 5 English needle sheath catheter was then advanced into the abdominal cavity. Fluid return was dark yellow colored.      A total volume of  2450mL was withdrawn. The catheter was then withdrawn and a sterile dressing was placed over the site. The patient tolerated the procedure well.     Complications: None.

## 2024-05-13 ENCOUNTER — TELEPHONE (OUTPATIENT)
Dept: HEMATOLOGY | Age: 69
End: 2024-05-13

## 2024-05-13 NOTE — TELEPHONE ENCOUNTER
Called [facility] to notify them that Kevin had been scheduled for surgery 5/21/24 with Dr. Sarah. Waiting a return call for confirmation. Called spoke with brother Zia as well. He confirmed surgery date, time, and location. He is aware that staff is waiting for a return call from facility to confirm transportation for Kevin. He is also aware that PAT will be reaching out with further instructions.     Kevin has Medicare A & B. No prior auth needed for CPT 89039.     Raquel from [facility] called and LVM with office that transportation was confirmed for Kevin's surgery 5/21/24.

## 2024-05-15 LAB — SURGICAL PATHOLOGY REPORT: NORMAL

## 2024-05-21 ENCOUNTER — TELEPHONE (OUTPATIENT)
Dept: HEMATOLOGY | Age: 69
End: 2024-05-21

## 2024-05-21 NOTE — TELEPHONE ENCOUNTER
I called [facility] spoke to Raquel to check on transportation availability for tomorrow at 7:30am for port insertion.  She stated he is refusing a port and hospice is coming in today to talk to the patient.  I asked Raquel to let our office know what patient decides to do.    Electronically signed by Brenda Mijares RN on 5/21/2024 at 7:36 AM

## 2024-05-22 ENCOUNTER — HOSPITAL ENCOUNTER (OUTPATIENT)
Dept: INTERVENTIONAL RADIOLOGY/VASCULAR | Age: 69
Discharge: HOME | End: 2024-05-24
Payer: MEDICARE

## 2024-05-22 VITALS
DIASTOLIC BLOOD PRESSURE: 83 MMHG | TEMPERATURE: 97.2 F | RESPIRATION RATE: 18 BRPM | OXYGEN SATURATION: 95 % | SYSTOLIC BLOOD PRESSURE: 116 MMHG | HEART RATE: 120 BPM

## 2024-05-22 DIAGNOSIS — K70.31 ALCOHOLIC CIRRHOSIS OF LIVER WITH ASCITES (HCC): ICD-10-CM

## 2024-05-22 LAB
BASOPHILS # BLD: 0.03 K/UL (ref 0–0.2)
BASOPHILS NFR BLD: 0 % (ref 0–2)
EOSINOPHIL # BLD: 0.01 K/UL (ref 0.05–0.5)
EOSINOPHILS RELATIVE PERCENT: 0 % (ref 0–6)
ERYTHROCYTE [DISTWIDTH] IN BLOOD BY AUTOMATED COUNT: 22.2 % (ref 11.5–15)
HCT VFR BLD AUTO: 41 % (ref 37–54)
HGB BLD-MCNC: 12.2 G/DL (ref 12.5–16.5)
IMM GRANULOCYTES # BLD AUTO: 0.2 K/UL (ref 0–0.58)
IMM GRANULOCYTES NFR BLD: 2 % (ref 0–5)
LYMPHOCYTES NFR BLD: 2.67 K/UL (ref 1.5–4)
LYMPHOCYTES RELATIVE PERCENT: 30 % (ref 20–42)
MCH RBC QN AUTO: 22.8 PG (ref 26–35)
MCHC RBC AUTO-ENTMCNC: 29.8 G/DL (ref 32–34.5)
MCV RBC AUTO: 76.6 FL (ref 80–99.9)
MONOCYTES NFR BLD: 0.74 K/UL (ref 0.1–0.95)
MONOCYTES NFR BLD: 8 % (ref 2–12)
NEUTROPHILS NFR BLD: 59 % (ref 43–80)
NEUTS SEG NFR BLD: 5.21 K/UL (ref 1.8–7.3)
PLATELET # BLD AUTO: 217 K/UL (ref 130–450)
PMV BLD AUTO: 9.2 FL (ref 7–12)
RBC # BLD AUTO: 5.35 M/UL (ref 3.8–5.8)
RBC # BLD: ABNORMAL 10*6/UL
WBC OTHER # BLD: 8.9 K/UL (ref 4.5–11.5)

## 2024-05-22 PROCEDURE — 85025 COMPLETE CBC W/AUTO DIFF WBC: CPT

## 2024-05-22 PROCEDURE — 36415 COLL VENOUS BLD VENIPUNCTURE: CPT

## 2024-05-22 PROCEDURE — 76705 ECHO EXAM OF ABDOMEN: CPT

## 2024-05-22 NOTE — PROGRESS NOTES
Labwork came back. Within functional limits. Report called to [facility] jennifer. Called transportation company to pick patient up. Discharge instructions given. Voiced understanding. All belongings sent with patient.

## 2024-05-22 NOTE — PROGRESS NOTES
Lethargic, pale, tachycardic, complaints of emesis the past two days. CBC drawn and sent to lab. Dr. Campuzano aware, Ultrasound of abdomen shows trace ascites. No paracentesis today. Waiting on CBC to result for next course of action.

## 2024-06-17 VITALS
TEMPERATURE: 98.1 F | BODY MASS INDEX: 34.96 KG/M2 | RESPIRATION RATE: 18 BRPM | SYSTOLIC BLOOD PRESSURE: 124 MMHG | HEART RATE: 79 BPM | DIASTOLIC BLOOD PRESSURE: 74 MMHG | WEIGHT: 272.4 LBS | OXYGEN SATURATION: 97 %

## 2024-06-17 ASSESSMENT — ENCOUNTER SYMPTOMS
ABDOMINAL PAIN: 0
ABDOMINAL DISTENTION: 1
RHINORRHEA: 0
TROUBLE SWALLOWING: 0
VOICE CHANGE: 0
DIARRHEA: 0
PHOTOPHOBIA: 0
VOMITING: 0
NAUSEA: 0
WHEEZING: 0
CHEST TIGHTNESS: 0
SORE THROAT: 0
BACK PAIN: 0
SHORTNESS OF BREATH: 0
CONSTIPATION: 0
COUGH: 0
BLOOD IN STOOL: 0
EYE PAIN: 0

## 2024-06-18 NOTE — PROGRESS NOTES
light-headedness.   Hematological:  Does not bruise/bleed easily.        Current Meds: Refer to nursing home record    PMH:    Medical Problems: reviewed and updated; refer to nursing home record       Diagnosis Date    Adult failure to thrive     Cardiomegaly     Chronic fatigue     Cognitive communication deficit     Difficulty walking     Disease of pancreas     DU (duodenal ulcer)     Dysphagia, oral phase     GERD (gastroesophageal reflux disease)     Iron deficiency anemia     Malignant neoplasm of abdomen (HCC)     Muscle atrophy     Other ascites     Other cirrhosis of liver (HCC)     Personal care impairment     Pleural effusion     Secondary malignant neoplasm of liver (HCC)         Surgical Hx: reviewed and updated; refer to nursing home record   Past Surgical History:   Procedure Laterality Date    CT NEEDLE BIOPSY LIVER PERCUTANEOUS  04/24/2024    CT NEEDLE BIOPSY LIVER PERCUTANEOUS 4/24/2024 JERICA Grover MD [facility] CT    EYE SURGERY      lens implant rt eye    PARACENTESIS Left 04/24/2024    4400 ml serous removed.    PARACENTESIS Left 05/08/2024    2450mL dark yellow ascites fluid removed    UPPER GASTROINTESTINAL ENDOSCOPY N/A 04/26/2024    ESOPHAGOGASTRODUODENOSCOPY BIOPSY performed by Hayden Carvalho MD at [facility] ENDOSCOPY    UPPER GASTROINTESTINAL ENDOSCOPY N/A 04/26/2024    ESOPHAGOGASTRODUODENOSCOPY ENDOSCOPIC ULTRASOUND FINE NEEDLE ASPIRATION performed by Hayden Carvalho MD at [facility] ENDOSCOPY        FH: reviewed and updated; refer to nursing home record     SH: reviewed and updated; refer to nursing home record     Objective:  Vitals:    05/07/24 2009   BP: 124/74   Pulse: 79   Resp: 18   Temp: 98.1 °F (36.7 °C)   SpO2: 97%         Physical Exam  Constitutional:       Appearance: Normal appearance.   HENT:      Head: Normocephalic and atraumatic.      Mouth/Throat:      Pharynx: Oropharynx is clear.   Eyes:      Extraocular Movements: Extraocular movements intact.      Pupils: Pupils are equal, round,

## (undated) DEVICE — CANNULA NSL ORAL AD FOR CAPNOFLEX CO2 O2 AIRLFE

## (undated) DEVICE — CONTAINER SPEC 60ML PH 7NEUTRAL BUFF FRMLN RDY TO USE

## (undated) DEVICE — FORCEPS BX L160CM JAW DIA2.4MM YEL L CAP W/ NDL DISP RAD

## (undated) DEVICE — Device: Brand: BALLOON3

## (undated) DEVICE — DEFENDO AIR WATER SUCTION AND BIOPSY VALVE KIT FOR  OLYMPUS: Brand: DEFENDO AIR/WATER/SUCTION AND BIOPSY VALVE

## (undated) DEVICE — GAUZE,SPONGE,4"X4",8PLY,STRL,LF,10/TRAY: Brand: MEDLINE

## (undated) DEVICE — BITEBLOCK 54FR W/ DENT RIM BLOX

## (undated) DEVICE — ENDOSCOPIC ULTRASOUND FINE NEEDLE BIOPSY (FNB) DEVICE: Brand: ACQUIRE